# Patient Record
Sex: FEMALE | Race: WHITE | NOT HISPANIC OR LATINO | Employment: FULL TIME | URBAN - NONMETROPOLITAN AREA
[De-identification: names, ages, dates, MRNs, and addresses within clinical notes are randomized per-mention and may not be internally consistent; named-entity substitution may affect disease eponyms.]

---

## 2022-01-06 ENCOUNTER — APPOINTMENT (EMERGENCY)
Dept: RADIOLOGY | Facility: HOSPITAL | Age: 62
End: 2022-01-06
Payer: COMMERCIAL

## 2022-01-06 ENCOUNTER — HOSPITAL ENCOUNTER (EMERGENCY)
Facility: HOSPITAL | Age: 62
Discharge: HOME | End: 2022-01-06
Attending: EMERGENCY MEDICINE | Admitting: EMERGENCY MEDICINE
Payer: COMMERCIAL

## 2022-01-06 ENCOUNTER — APPOINTMENT (EMERGENCY)
Dept: CT IMAGING | Facility: HOSPITAL | Age: 62
End: 2022-01-06
Payer: COMMERCIAL

## 2022-01-06 VITALS
DIASTOLIC BLOOD PRESSURE: 71 MMHG | HEIGHT: 62 IN | TEMPERATURE: 98.3 F | HEART RATE: 81 BPM | RESPIRATION RATE: 16 BRPM | OXYGEN SATURATION: 95 % | WEIGHT: 136 LBS | SYSTOLIC BLOOD PRESSURE: 112 MMHG | BODY MASS INDEX: 25.03 KG/M2

## 2022-01-06 DIAGNOSIS — R05.9 COUGH: Primary | ICD-10-CM

## 2022-01-06 LAB
ALBUMIN SERPL BCP-MCNC: 3.2 G/DL (ref 3.5–5)
ALP SERPL-CCNC: 83 U/L (ref 46–116)
ALT SERPL W P-5'-P-CCNC: 25 U/L (ref 12–78)
ANION GAP SERPL CALCULATED.3IONS-SCNC: 8 MMOL/L (ref 4–13)
AST SERPL W P-5'-P-CCNC: 36 U/L (ref 5–45)
ATRIAL RATE: 70 BPM
BASOPHILS # BLD AUTO: 0.06 THOUSANDS/ΜL (ref 0–0.1)
BASOPHILS NFR BLD AUTO: 1 % (ref 0–1)
BILIRUB SERPL-MCNC: 0.55 MG/DL (ref 0.2–1)
BUN SERPL-MCNC: 11 MG/DL (ref 5–25)
CALCIUM ALBUM COR SERPL-MCNC: 9 MG/DL (ref 8.3–10.1)
CALCIUM SERPL-MCNC: 8.4 MG/DL (ref 8.3–10.1)
CARDIAC TROPONIN I PNL SERPL HS: 3 NG/L
CHLORIDE SERPL-SCNC: 107 MMOL/L (ref 100–108)
CO2 SERPL-SCNC: 25 MMOL/L (ref 21–32)
CREAT SERPL-MCNC: 0.76 MG/DL (ref 0.6–1.3)
D DIMER PPP FEU-MCNC: 0.6 UG/ML FEU
EOSINOPHIL # BLD AUTO: 0.5 THOUSAND/ΜL (ref 0–0.61)
EOSINOPHIL NFR BLD AUTO: 9 % (ref 0–6)
ERYTHROCYTE [DISTWIDTH] IN BLOOD BY AUTOMATED COUNT: 12.9 % (ref 11.6–15.1)
GFR SERPL CREATININE-BSD FRML MDRD: 84 ML/MIN/1.73SQ M
GLUCOSE SERPL-MCNC: 91 MG/DL (ref 65–140)
HCT VFR BLD AUTO: 40.2 % (ref 34.8–46.1)
HGB BLD-MCNC: 13.4 G/DL (ref 11.5–15.4)
IMM GRANULOCYTES # BLD AUTO: 0.02 THOUSAND/UL (ref 0–0.2)
IMM GRANULOCYTES NFR BLD AUTO: 0 % (ref 0–2)
LYMPHOCYTES # BLD AUTO: 0.85 THOUSANDS/ΜL (ref 0.6–4.47)
LYMPHOCYTES NFR BLD AUTO: 15 % (ref 14–44)
MCH RBC QN AUTO: 31.2 PG (ref 26.8–34.3)
MCHC RBC AUTO-ENTMCNC: 33.3 G/DL (ref 31.4–37.4)
MCV RBC AUTO: 94 FL (ref 82–98)
MONOCYTES # BLD AUTO: 0.39 THOUSAND/ΜL (ref 0.17–1.22)
MONOCYTES NFR BLD AUTO: 7 % (ref 4–12)
NEUTROPHILS # BLD AUTO: 3.81 THOUSANDS/ΜL (ref 1.85–7.62)
NEUTS SEG NFR BLD AUTO: 68 % (ref 43–75)
NRBC BLD AUTO-RTO: 0 /100 WBCS
P AXIS: 54 DEGREES
PLATELET # BLD AUTO: 263 THOUSANDS/UL (ref 149–390)
PMV BLD AUTO: 9 FL (ref 8.9–12.7)
POTASSIUM SERPL-SCNC: 4.2 MMOL/L (ref 3.5–5.3)
PR INTERVAL: 176 MS
PROT SERPL-MCNC: 6.9 G/DL (ref 6.4–8.2)
QRS AXIS: 88 DEGREES
QRSD INTERVAL: 70 MS
QT INTERVAL: 412 MS
QTC INTERVAL: 444 MS
RBC # BLD AUTO: 4.29 MILLION/UL (ref 3.81–5.12)
SODIUM SERPL-SCNC: 140 MMOL/L (ref 136–145)
T WAVE AXIS: 47 DEGREES
VENTRICULAR RATE: 70 BPM
WBC # BLD AUTO: 5.63 THOUSAND/UL (ref 4.31–10.16)

## 2022-01-06 PROCEDURE — 85379 FIBRIN DEGRADATION QUANT: CPT | Performed by: EMERGENCY MEDICINE

## 2022-01-06 PROCEDURE — 93005 ELECTROCARDIOGRAM TRACING: CPT

## 2022-01-06 PROCEDURE — 71275 CT ANGIOGRAPHY CHEST: CPT

## 2022-01-06 PROCEDURE — G1004 CDSM NDSC: HCPCS

## 2022-01-06 PROCEDURE — 71045 X-RAY EXAM CHEST 1 VIEW: CPT

## 2022-01-06 PROCEDURE — 36415 COLL VENOUS BLD VENIPUNCTURE: CPT

## 2022-01-06 PROCEDURE — 99285 EMERGENCY DEPT VISIT HI MDM: CPT

## 2022-01-06 PROCEDURE — 85025 COMPLETE CBC W/AUTO DIFF WBC: CPT | Performed by: EMERGENCY MEDICINE

## 2022-01-06 PROCEDURE — 99285 EMERGENCY DEPT VISIT HI MDM: CPT | Performed by: EMERGENCY MEDICINE

## 2022-01-06 PROCEDURE — 93010 ELECTROCARDIOGRAM REPORT: CPT | Performed by: INTERNAL MEDICINE

## 2022-01-06 PROCEDURE — 80053 COMPREHEN METABOLIC PANEL: CPT | Performed by: EMERGENCY MEDICINE

## 2022-01-06 PROCEDURE — 84484 ASSAY OF TROPONIN QUANT: CPT | Performed by: EMERGENCY MEDICINE

## 2022-01-06 RX ADMIN — IOHEXOL 85 ML: 350 INJECTION, SOLUTION INTRAVENOUS at 09:09

## 2022-01-06 NOTE — DISCHARGE INSTRUCTIONS
The CT scan performed today did not show any evidence of blood clots in your lungs  It did show some scattered opacities in the lungs which may be due to residual findings from previous COVID infection or other viral pneumonia  However you have no fever or abnormality and the blood work to indicate bacterial pneumonia  There is no indication at this time for treatment with antibiotics  The CT scan also revealed inflamed lymph nodes in the lungs for which you should follow-up in the very near future with your cancer specialist   I also recommend that you follow-up with the gastroenterologist listed below or the gastroenterologist of your choice as the pain that you are experiencing may be related to acid reflux disease

## 2022-01-06 NOTE — ED PROVIDER NOTES
History  Chief Complaint   Patient presents with    Shortness of Breath     SOB x 2 weeks, just finished Doxy for possible Pna  Had to sit down after drying her hair  Intermittent headaches  Back pain on deep breathing  PCP would like PE r/o, Pt sp[eaking in complete sentences in Triage and in no acute distress  Patient with prior history of lung cancer and left lower lobe resection, now in remission, had COVID infection in November, now complains of 2 weeks of shortness of breath, epigastric burning, coughing  No fevers or nausea or vomiting  No diarrhea or other abdominal pain  Patient had completed 3 days of prednisone and a course of doxycycline  Patient sent to ED by PCP for evaluation of possible PE  Patient does have a history of acid reflux however does not take any acid reflux medications  History provided by:  Patient   used: No    Shortness of Breath  Severity:  Moderate  Onset quality:  Gradual  Duration:  2 weeks  Timing:  Constant  Progression:  Unchanged  Chronicity:  New  Relieved by:  Nothing  Worsened by:  Nothing  Ineffective treatments:  None tried  Associated symptoms: cough    Associated symptoms: no abdominal pain, no chest pain, no ear pain, no fever, no headaches, no hemoptysis, no neck pain, no rash, no sore throat, no sputum production, no syncope, no vomiting and no wheezing        None       No past medical history on file  No past surgical history on file  No family history on file  I have reviewed and agree with the history as documented  E-Cigarette/Vaping     E-Cigarette/Vaping Substances     Social History     Tobacco Use    Smoking status: Former Smoker     Types: Cigarettes     Quit date: 2008     Years since quittin 0    Smokeless tobacco: Never Used   Substance Use Topics    Alcohol use: Not Currently    Drug use: Never       Review of Systems   Constitutional: Negative for chills and fever     HENT: Negative for ear pain, hearing loss, sore throat, trouble swallowing and voice change  Eyes: Negative for pain and discharge  Respiratory: Positive for cough and shortness of breath  Negative for hemoptysis, sputum production and wheezing  Cardiovascular: Negative for chest pain, palpitations and syncope  Gastrointestinal: Negative for abdominal pain, blood in stool, constipation, diarrhea, nausea and vomiting  Genitourinary: Negative for dysuria, flank pain, frequency and hematuria  Musculoskeletal: Negative for joint swelling, neck pain and neck stiffness  Skin: Negative for rash and wound  Neurological: Negative for dizziness, seizures, syncope, facial asymmetry and headaches  Psychiatric/Behavioral: Negative for hallucinations, self-injury and suicidal ideas  All other systems reviewed and are negative  Physical Exam  Physical Exam  Vitals and nursing note reviewed  Constitutional:       General: She is not in acute distress  Appearance: She is well-developed  HENT:      Head: Normocephalic and atraumatic  Right Ear: External ear normal       Left Ear: External ear normal    Eyes:      General: No scleral icterus  Right eye: No discharge  Left eye: No discharge  Extraocular Movements: Extraocular movements intact  Conjunctiva/sclera: Conjunctivae normal    Cardiovascular:      Rate and Rhythm: Normal rate and regular rhythm  Heart sounds: Normal heart sounds  No murmur heard  Pulmonary:      Effort: Pulmonary effort is normal  No tachypnea, accessory muscle usage or respiratory distress  Breath sounds: Normal breath sounds  No stridor  No decreased breath sounds, wheezing, rhonchi or rales  Abdominal:      General: Bowel sounds are normal  There is no distension  Palpations: Abdomen is soft  Tenderness: There is no abdominal tenderness  There is no guarding or rebound  Musculoskeletal:         General: No deformity  Normal range of motion  Cervical back: Normal range of motion and neck supple  Skin:     General: Skin is warm and dry  Findings: No rash  Neurological:      General: No focal deficit present  Mental Status: She is alert and oriented to person, place, and time  Cranial Nerves: No cranial nerve deficit  Psychiatric:         Mood and Affect: Mood normal          Behavior: Behavior normal          Thought Content:  Thought content normal          Judgment: Judgment normal          Vital Signs  ED Triage Vitals   Temperature Pulse Respirations Blood Pressure SpO2   01/06/22 0633 01/06/22 0616 01/06/22 0616 01/06/22 0616 01/06/22 0616   98 3 °F (36 8 °C) 81 16 112/71 95 %      Temp Source Heart Rate Source Patient Position - Orthostatic VS BP Location FiO2 (%)   01/06/22 0633 01/06/22 0616 01/06/22 0616 01/06/22 0616 --   Temporal Monitor Sitting Right arm       Pain Score       --                  Vitals:    01/06/22 0616   BP: 112/71   Pulse: 81   Patient Position - Orthostatic VS: Sitting         Visual Acuity      ED Medications  Medications   iohexol (OMNIPAQUE) 350 MG/ML injection (SINGLE-DOSE) 85 mL (85 mL Intravenous Given 1/6/22 0909)       Diagnostic Studies  Results Reviewed     Procedure Component Value Units Date/Time    D-dimer, quantitative [[id number]]  (Abnormal) Collected: 01/06/22 0649    Lab Status: Final result Specimen: Blood from Arm, Left Updated: 01/06/22 0820     D-Dimer, Quant 0 60 ug/ml FEU     HS Troponin I 4hr [[id number]]     Lab Status: No result Specimen: Blood     HS Troponin 0hr (reflex protocol) [[id number]]  (Normal) Collected: 01/06/22 0649    Lab Status: Final result Specimen: Blood from Arm, Left Updated: 01/06/22 0725     hs TnI 0hr 3 ng/L     HS Troponin I 2hr [[id number]]     Lab Status: No result Specimen: Blood     Comprehensive metabolic panel [[id number]]  (Abnormal) Collected: 01/06/22 0649    Lab Status: Final result Specimen: Blood from Arm, Left Updated: 01/06/22 5877 Sodium 140 mmol/L      Potassium 4 2 mmol/L      Chloride 107 mmol/L      CO2 25 mmol/L      ANION GAP 8 mmol/L      BUN 11 mg/dL      Creatinine 0 76 mg/dL      Glucose 91 mg/dL      Calcium 8 4 mg/dL      Corrected Calcium 9 0 mg/dL      AST 36 U/L      ALT 25 U/L      Alkaline Phosphatase 83 U/L      Total Protein 6 9 g/dL      Albumin 3 2 g/dL      Total Bilirubin 0 55 mg/dL      eGFR 84 ml/min/1 73sq m     Narrative:      [employer] guidelines for Chronic Kidney Disease (CKD):     Stage 1 with normal or high GFR (GFR > 90 mL/min/1 73 square meters)    Stage 2 Mild CKD (GFR = 60-89 mL/min/1 73 square meters)    Stage 3A Moderate CKD (GFR = 45-59 mL/min/1 73 square meters)    Stage 3B Moderate CKD (GFR = 30-44 mL/min/1 73 square meters)    Stage 4 Severe CKD (GFR = 15-29 mL/min/1 73 square meters)    Stage 5 End Stage CKD (GFR <15 mL/min/1 73 square meters)  Note: GFR calculation is accurate only with a steady state creatinine    CBC and differential [[id number]]  (Abnormal) Collected: 01/06/22 0649    Lab Status: Final result Specimen: Blood from Arm, Left Updated: 01/06/22 0654     WBC 5 63 Thousand/uL      RBC 4 29 Million/uL      Hemoglobin 13 4 g/dL      Hematocrit 40 2 %      MCV 94 fL      MCH 31 2 pg      MCHC 33 3 g/dL      RDW 12 9 %      MPV 9 0 fL      Platelets 363 Thousands/uL      nRBC 0 /100 WBCs      Neutrophils Relative 68 %      Immat GRANS % 0 %      Lymphocytes Relative 15 %      Monocytes Relative 7 %      Eosinophils Relative 9 %      Basophils Relative 1 %      Neutrophils Absolute 3 81 Thousands/µL      Immature Grans Absolute 0 02 Thousand/uL      Lymphocytes Absolute 0 85 Thousands/µL      Monocytes Absolute 0 39 Thousand/µL      Eosinophils Absolute 0 50 Thousand/µL      Basophils Absolute 0 06 Thousands/µL                  CTA ed chest pe study   Final Result by Jurgen Concepcion MD (01/06 0935)      No evidence pulmonary embolus        Scattered groundglass opacities likely infectious in etiology  Conglomerate mediastinal and bilateral hilar lymphadenopathy, neoplastic involvement not excluded given history of lung carcinoma  Correlation with outside studies recommended  Workstation performed: [id number]         XR chest 1 view   ED Interpretation by Charley Paredes MD (01/06 5930)   No acute finding      Final Result by Shamar Gallego MD (01/06 0932)      Faint bilateral groundglass opacities  Findings are nonspecific but may represent multifocal pneumonia (to include sequela of Covid 19 associated pneumonia) as well as pulmonary interstitial edema  Clinical and laboratory correlation recommended  Workstation performed: [id number]                    Procedures  ECG 12 Lead Documentation Only    Date/Time: 1/6/2022 9:00 AM  Performed by: Charley Paredes MD  Authorized by: Charley Paredes MD     ECG reviewed by me, the ED Provider: yes    Patient location:  ED  Previous ECG:     Previous ECG:  Unavailable  Interpretation:     Interpretation: normal    Rate:     ECG rate:  70    ECG rate assessment: normal    Rhythm:     Rhythm: sinus rhythm    Ectopy:     Ectopy: none    QRS:     QRS axis:  Normal    QRS intervals:  Normal  Conduction:     Conduction: normal    ST segments:     ST segments:  Normal  T waves:     T waves: normal               ED Course  ED Course as of 01/06/22 0947   Thu Jan 06, 2022   0943 No pulmonary embolus on CTA  Discussed findings of hilar lymphadenopathy and need for follow-up with her cancer specialists with patient as well as finding of scattered opacities, likely infectious  These are likely residual from her previous COVID infection  Patient has no fevers or elevated white count  No indication at this time for antibiotic treatment    Patient has been provided with a CD of her CT scan to take with her for her next appointment with her oncologist MDM  Number of Diagnoses or Management Options     Amount and/or Complexity of Data Reviewed  Clinical lab tests: ordered and reviewed  Tests in the radiology section of CPT®: ordered and reviewed  Decide to obtain previous medical records or to obtain history from someone other than the patient: yes  Review and summarize past medical records: yes  Independent visualization of images, tracings, or specimens: yes        Disposition  Final diagnoses:   Cough     Time reflects when diagnosis was documented in both MDM as applicable and the Disposition within this note     Time User Action Codes Description Comment    1/6/2022  9:44 AM Carrie Sor Add [R05 9] Cough       ED Disposition     ED Disposition Condition Date/Time Comment    Discharge Stable u Jan 6, 2022  9:44 AM [facility] discharge to home/self care  Follow-up Information     Follow up With Specialties Details Why Contact Info    Your primary care physician   As needed     Erick Stein MD Gastroenterology   [address]  [address] [facility]  [phone]            Patient's Medications    No medications on file       No discharge procedures on file      PDMP Review     None          ED Provider  Electronically Signed by           Nancy Antunez MD  01/06/22 4694

## 2022-01-13 ENCOUNTER — HOSPITAL ENCOUNTER (INPATIENT)
Facility: HOSPITAL | Age: 62
LOS: 1 days | Discharge: HOME | DRG: 189 | End: 2022-01-14
Attending: EMERGENCY MEDICINE | Admitting: FAMILY MEDICINE
Payer: COMMERCIAL

## 2022-01-13 ENCOUNTER — APPOINTMENT (EMERGENCY)
Dept: CT IMAGING | Facility: HOSPITAL | Age: 62
DRG: 189 | End: 2022-01-13
Payer: COMMERCIAL

## 2022-01-13 DIAGNOSIS — J84.9 BILATERAL INTERSTITIAL PNEUMONIA (HCC): ICD-10-CM

## 2022-01-13 DIAGNOSIS — J18.9 BILATERAL PNEUMONIA: ICD-10-CM

## 2022-01-13 DIAGNOSIS — R06.00 DYSPNEA: ICD-10-CM

## 2022-01-13 DIAGNOSIS — R09.02 HYPOXIA: Primary | ICD-10-CM

## 2022-01-13 DIAGNOSIS — J96.01 ACUTE RESPIRATORY FAILURE WITH HYPOXIA (HCC): ICD-10-CM

## 2022-01-13 DIAGNOSIS — J45.21 MILD INTERMITTENT ASTHMA WITH ACUTE EXACERBATION: ICD-10-CM

## 2022-01-13 LAB
ANION GAP SERPL CALCULATED.3IONS-SCNC: 9 MMOL/L (ref 4–13)
BASOPHILS # BLD AUTO: 0.05 THOUSANDS/ΜL (ref 0–0.1)
BASOPHILS NFR BLD AUTO: 1 % (ref 0–1)
BUN SERPL-MCNC: 14 MG/DL (ref 5–25)
CALCIUM SERPL-MCNC: 8.5 MG/DL (ref 8.3–10.1)
CHLORIDE SERPL-SCNC: 103 MMOL/L (ref 100–108)
CO2 SERPL-SCNC: 25 MMOL/L (ref 21–32)
CREAT SERPL-MCNC: 0.89 MG/DL (ref 0.6–1.3)
EOSINOPHIL # BLD AUTO: 0.56 THOUSAND/ΜL (ref 0–0.61)
EOSINOPHIL NFR BLD AUTO: 6 % (ref 0–6)
ERYTHROCYTE [DISTWIDTH] IN BLOOD BY AUTOMATED COUNT: 12.8 % (ref 11.6–15.1)
FLUAV RNA RESP QL NAA+PROBE: NEGATIVE
FLUBV RNA RESP QL NAA+PROBE: NEGATIVE
GFR SERPL CREATININE-BSD FRML MDRD: 70 ML/MIN/1.73SQ M
GLUCOSE SERPL-MCNC: 100 MG/DL (ref 65–140)
HCT VFR BLD AUTO: 37.5 % (ref 34.8–46.1)
HGB BLD-MCNC: 12.3 G/DL (ref 11.5–15.4)
IMM GRANULOCYTES # BLD AUTO: 0.03 THOUSAND/UL (ref 0–0.2)
IMM GRANULOCYTES NFR BLD AUTO: 0 % (ref 0–2)
LYMPHOCYTES # BLD AUTO: 0.94 THOUSANDS/ΜL (ref 0.6–4.47)
LYMPHOCYTES NFR BLD AUTO: 10 % (ref 14–44)
MCH RBC QN AUTO: 30.6 PG (ref 26.8–34.3)
MCHC RBC AUTO-ENTMCNC: 32.8 G/DL (ref 31.4–37.4)
MCV RBC AUTO: 93 FL (ref 82–98)
MONOCYTES # BLD AUTO: 0.45 THOUSAND/ΜL (ref 0.17–1.22)
MONOCYTES NFR BLD AUTO: 5 % (ref 4–12)
NEUTROPHILS # BLD AUTO: 7.2 THOUSANDS/ΜL (ref 1.85–7.62)
NEUTS SEG NFR BLD AUTO: 78 % (ref 43–75)
NRBC BLD AUTO-RTO: 0 /100 WBCS
PLATELET # BLD AUTO: 344 THOUSANDS/UL (ref 149–390)
PMV BLD AUTO: 9.1 FL (ref 8.9–12.7)
POTASSIUM SERPL-SCNC: 4.1 MMOL/L (ref 3.5–5.3)
RBC # BLD AUTO: 4.02 MILLION/UL (ref 3.81–5.12)
RSV RNA RESP QL NAA+PROBE: NEGATIVE
SARS-COV-2 RNA RESP QL NAA+PROBE: NEGATIVE
SODIUM SERPL-SCNC: 137 MMOL/L (ref 136–145)
WBC # BLD AUTO: 9.23 THOUSAND/UL (ref 4.31–10.16)

## 2022-01-13 PROCEDURE — 36415 COLL VENOUS BLD VENIPUNCTURE: CPT | Performed by: EMERGENCY MEDICINE

## 2022-01-13 PROCEDURE — 87040 BLOOD CULTURE FOR BACTERIA: CPT | Performed by: EMERGENCY MEDICINE

## 2022-01-13 PROCEDURE — 84145 PROCALCITONIN (PCT): CPT | Performed by: NURSE PRACTITIONER

## 2022-01-13 PROCEDURE — 0241U HB NFCT DS VIR RESP RNA 4 TRGT: CPT | Performed by: EMERGENCY MEDICINE

## 2022-01-13 PROCEDURE — 80048 BASIC METABOLIC PNL TOTAL CA: CPT | Performed by: EMERGENCY MEDICINE

## 2022-01-13 PROCEDURE — 83735 ASSAY OF MAGNESIUM: CPT | Performed by: NURSE PRACTITIONER

## 2022-01-13 PROCEDURE — 85025 COMPLETE CBC W/AUTO DIFF WBC: CPT | Performed by: EMERGENCY MEDICINE

## 2022-01-13 PROCEDURE — 99285 EMERGENCY DEPT VISIT HI MDM: CPT

## 2022-01-13 PROCEDURE — 71275 CT ANGIOGRAPHY CHEST: CPT

## 2022-01-13 PROCEDURE — 96374 THER/PROPH/DIAG INJ IV PUSH: CPT

## 2022-01-13 PROCEDURE — 99285 EMERGENCY DEPT VISIT HI MDM: CPT | Performed by: EMERGENCY MEDICINE

## 2022-01-13 PROCEDURE — 83605 ASSAY OF LACTIC ACID: CPT | Performed by: EMERGENCY MEDICINE

## 2022-01-13 RX ORDER — CEFTRIAXONE 1 G/50ML
1000 INJECTION, SOLUTION INTRAVENOUS ONCE
Status: COMPLETED | OUTPATIENT
Start: 2022-01-13 | End: 2022-01-14

## 2022-01-13 RX ORDER — BENZONATATE 100 MG/1
200 CAPSULE ORAL ONCE
Status: COMPLETED | OUTPATIENT
Start: 2022-01-13 | End: 2022-01-13

## 2022-01-13 RX ADMIN — MORPHINE SULFATE 2 MG: 2 INJECTION, SOLUTION INTRAMUSCULAR; INTRAVENOUS at 22:02

## 2022-01-13 RX ADMIN — IOHEXOL 85 ML: 350 INJECTION, SOLUTION INTRAVENOUS at 22:35

## 2022-01-13 RX ADMIN — BENZONATATE 200 MG: 100 CAPSULE ORAL at 22:03

## 2022-01-13 NOTE — Clinical Note
Curtis Harada was seen and treated in our emergency department on 1/13/2022  Diagnosis:     Afshin Jiménez  may return to work on return date  She may return on this date: 01/25/2022         If you have any questions or concerns, please don't hesitate to call        Chiqui Walters PA-C    ______________________________           _______________          _______________  Hospital Representative                              Date                                Time

## 2022-01-13 NOTE — Clinical Note
Marylu Roula was seen and treated in our emergency department on 1/13/2022  Diagnosis:     Nelly Humphries  may return to work on return date  She may return on this date: 01/25/2022         If you have any questions or concerns, please don't hesitate to call        Carlos Vieira PA-C    ______________________________           _______________          _______________  Hospital Representative                              Date                                Time

## 2022-01-14 VITALS
BODY MASS INDEX: 24.84 KG/M2 | SYSTOLIC BLOOD PRESSURE: 105 MMHG | HEART RATE: 70 BPM | HEIGHT: 62 IN | OXYGEN SATURATION: 97 % | DIASTOLIC BLOOD PRESSURE: 53 MMHG | RESPIRATION RATE: 16 BRPM | TEMPERATURE: 98.1 F | WEIGHT: 135 LBS

## 2022-01-14 PROBLEM — J96.01 ACUTE RESPIRATORY FAILURE WITH HYPOXIA (HCC): Status: ACTIVE | Noted: 2022-01-14

## 2022-01-14 PROBLEM — K76.89 HEPATIC CYST: Status: ACTIVE | Noted: 2022-01-14

## 2022-01-14 PROBLEM — Z85.118 HISTORY OF LUNG CANCER: Status: ACTIVE | Noted: 2022-01-14

## 2022-01-14 PROBLEM — J45.21 MILD INTERMITTENT ASTHMA WITH ACUTE EXACERBATION: Status: ACTIVE | Noted: 2022-01-14

## 2022-01-14 LAB
BASE EX.OXY STD BLDV CALC-SCNC: 64.4 % (ref 60–80)
BASE EXCESS BLDV CALC-SCNC: -0.2 MMOL/L
CARDIAC TROPONIN I PNL SERPL HS: 5 NG/L (ref 8–18)
CRP SERPL QL: 29.6 MG/L
DME PARACHUTE DELIVERY DATE ACTUAL: NORMAL
DME PARACHUTE DELIVERY DATE EXPECTED: NORMAL
DME PARACHUTE DELIVERY DATE REQUESTED: NORMAL
DME PARACHUTE DELIVERY NOTE: NORMAL
DME PARACHUTE ITEM DESCRIPTION: NORMAL
DME PARACHUTE ORDER STATUS: NORMAL
DME PARACHUTE SUPPLIER NAME: NORMAL
DME PARACHUTE SUPPLIER PHONE: NORMAL
FLUAV RNA RESP QL NAA+PROBE: NEGATIVE
FLUBV RNA RESP QL NAA+PROBE: NEGATIVE
HCO3 BLDV-SCNC: 25.2 MMOL/L (ref 24–30)
LACTATE SERPL-SCNC: 0.7 MMOL/L (ref 0.5–2)
MAGNESIUM SERPL-MCNC: 2.1 MG/DL (ref 1.6–2.6)
NT-PROBNP SERPL-MCNC: 205 PG/ML
O2 CT BLDV-SCNC: 11.6 ML/DL
PCO2 BLDV: 43.9 MM HG (ref 42–50)
PH BLDV: 7.38 [PH] (ref 7.3–7.4)
PO2 BLDV: 35.6 MM HG (ref 35–45)
PROCALCITONIN SERPL-MCNC: 0.08 NG/ML
PROCALCITONIN SERPL-MCNC: 0.08 NG/ML
RSV RNA RESP QL NAA+PROBE: NEGATIVE
SARS-COV-2 RNA RESP QL NAA+PROBE: NEGATIVE

## 2022-01-14 PROCEDURE — 87581 M.PNEUMON DNA AMP PROBE: CPT | Performed by: INTERNAL MEDICINE

## 2022-01-14 PROCEDURE — 86140 C-REACTIVE PROTEIN: CPT | Performed by: NURSE PRACTITIONER

## 2022-01-14 PROCEDURE — NC001 PR NO CHARGE: Performed by: FAMILY MEDICINE

## 2022-01-14 PROCEDURE — 94761 N-INVAS EAR/PLS OXIMETRY MLT: CPT

## 2022-01-14 PROCEDURE — 87633 RESP VIRUS 12-25 TARGETS: CPT | Performed by: INTERNAL MEDICINE

## 2022-01-14 PROCEDURE — 82805 BLOOD GASES W/O2 SATURATION: CPT | Performed by: NURSE PRACTITIONER

## 2022-01-14 PROCEDURE — 99205 OFFICE O/P NEW HI 60 MIN: CPT | Performed by: INTERNAL MEDICINE

## 2022-01-14 PROCEDURE — 36415 COLL VENOUS BLD VENIPUNCTURE: CPT | Performed by: NURSE PRACTITIONER

## 2022-01-14 PROCEDURE — 84484 ASSAY OF TROPONIN QUANT: CPT | Performed by: NURSE PRACTITIONER

## 2022-01-14 PROCEDURE — 0241U HB NFCT DS VIR RESP RNA 4 TRGT: CPT | Performed by: INTERNAL MEDICINE

## 2022-01-14 PROCEDURE — 87798 DETECT AGENT NOS DNA AMP: CPT | Performed by: INTERNAL MEDICINE

## 2022-01-14 PROCEDURE — 84145 PROCALCITONIN (PCT): CPT | Performed by: NURSE PRACTITIONER

## 2022-01-14 PROCEDURE — 83880 ASSAY OF NATRIURETIC PEPTIDE: CPT | Performed by: NURSE PRACTITIONER

## 2022-01-14 PROCEDURE — 99222 1ST HOSP IP/OBS MODERATE 55: CPT | Performed by: FAMILY MEDICINE

## 2022-01-14 PROCEDURE — 87486 CHLMYD PNEUM DNA AMP PROBE: CPT | Performed by: INTERNAL MEDICINE

## 2022-01-14 RX ORDER — PANTOPRAZOLE SODIUM 40 MG/1
40 TABLET, DELAYED RELEASE ORAL
Qty: 30 TABLET | Refills: 0 | Status: SHIPPED | OUTPATIENT
Start: 2022-01-15 | End: 2022-03-02

## 2022-01-14 RX ORDER — PREDNISONE 20 MG/1
TABLET ORAL
Qty: 18 TABLET | Refills: 0 | Status: SHIPPED | OUTPATIENT
Start: 2022-01-14 | End: 2022-01-26

## 2022-01-14 RX ORDER — BENZONATATE 100 MG/1
100 CAPSULE ORAL 3 TIMES DAILY PRN
Qty: 20 CAPSULE | Refills: 0 | Status: SHIPPED | OUTPATIENT
Start: 2022-01-14

## 2022-01-14 RX ORDER — IPRATROPIUM BROMIDE AND ALBUTEROL SULFATE 2.5; .5 MG/3ML; MG/3ML
3 SOLUTION RESPIRATORY (INHALATION)
Status: DISCONTINUED | OUTPATIENT
Start: 2022-01-14 | End: 2022-01-14

## 2022-01-14 RX ORDER — ALBUTEROL SULFATE 90 UG/1
2 AEROSOL, METERED RESPIRATORY (INHALATION) EVERY 4 HOURS PRN
Qty: 18 G | Refills: 0 | Status: SHIPPED | OUTPATIENT
Start: 2022-01-14

## 2022-01-14 RX ORDER — IPRATROPIUM BROMIDE AND ALBUTEROL SULFATE 2.5; .5 MG/3ML; MG/3ML
3 SOLUTION RESPIRATORY (INHALATION) 4 TIMES DAILY
Qty: 360 ML | Refills: 0 | Status: SHIPPED | OUTPATIENT
Start: 2022-01-14 | End: 2022-02-08 | Stop reason: SDUPTHER

## 2022-01-14 RX ORDER — ALBUTEROL SULFATE 90 UG/1
2 AEROSOL, METERED RESPIRATORY (INHALATION) EVERY 4 HOURS PRN
Status: DISCONTINUED | OUTPATIENT
Start: 2022-01-14 | End: 2022-01-14 | Stop reason: HOSPADM

## 2022-01-14 RX ORDER — BENZONATATE 100 MG/1
100 CAPSULE ORAL 3 TIMES DAILY PRN
Status: DISCONTINUED | OUTPATIENT
Start: 2022-01-14 | End: 2022-01-14 | Stop reason: HOSPADM

## 2022-01-14 RX ORDER — PANTOPRAZOLE SODIUM 40 MG/1
40 TABLET, DELAYED RELEASE ORAL
Status: DISCONTINUED | OUTPATIENT
Start: 2022-01-14 | End: 2022-01-14 | Stop reason: HOSPADM

## 2022-01-14 RX ORDER — ACETAMINOPHEN 325 MG/1
650 TABLET ORAL EVERY 6 HOURS PRN
Status: DISCONTINUED | OUTPATIENT
Start: 2022-01-14 | End: 2022-01-14 | Stop reason: HOSPADM

## 2022-01-14 RX ORDER — IPRATROPIUM BROMIDE AND ALBUTEROL SULFATE 2.5; .5 MG/3ML; MG/3ML
3 SOLUTION RESPIRATORY (INHALATION)
Status: DISCONTINUED | OUTPATIENT
Start: 2022-01-14 | End: 2022-01-14 | Stop reason: HOSPADM

## 2022-01-14 RX ORDER — ACETAMINOPHEN 325 MG/1
650 TABLET ORAL EVERY 6 HOURS PRN
Refills: 0
Start: 2022-01-14 | End: 2022-03-02

## 2022-01-14 RX ORDER — METHYLPREDNISOLONE SODIUM SUCCINATE 40 MG/ML
40 INJECTION, POWDER, LYOPHILIZED, FOR SOLUTION INTRAMUSCULAR; INTRAVENOUS EVERY 8 HOURS SCHEDULED
Status: DISCONTINUED | OUTPATIENT
Start: 2022-01-14 | End: 2022-01-14 | Stop reason: HOSPADM

## 2022-01-14 RX ADMIN — CEFTRIAXONE 1000 MG: 1 INJECTION, SOLUTION INTRAVENOUS at 01:18

## 2022-01-14 RX ADMIN — METHYLPREDNISOLONE SODIUM SUCCINATE 40 MG: 40 INJECTION, POWDER, FOR SOLUTION INTRAMUSCULAR; INTRAVENOUS at 14:46

## 2022-01-14 RX ADMIN — IPRATROPIUM BROMIDE AND ALBUTEROL SULFATE 3 ML: 2.5; .5 SOLUTION RESPIRATORY (INHALATION) at 11:55

## 2022-01-14 RX ADMIN — ENOXAPARIN SODIUM 40 MG: 40 INJECTION SUBCUTANEOUS at 09:17

## 2022-01-14 RX ADMIN — PANTOPRAZOLE SODIUM 40 MG: 40 TABLET, DELAYED RELEASE ORAL at 09:16

## 2022-01-14 RX ADMIN — METHYLPREDNISOLONE SODIUM SUCCINATE 40 MG: 40 INJECTION, POWDER, FOR SOLUTION INTRAMUSCULAR; INTRAVENOUS at 03:12

## 2022-01-14 NOTE — ED PROVIDER NOTES
History  Chief Complaint   Patient presents with    Shortness of Breath     Pt reports ongoing pain at bottom of rib cage and worsenign SOB, appt with oncology on Monday      64year-old female somewhat anxious presents emergency room complaining of right-sided chest pain and shortness of breath  Patient reports pain is primarily localized to her right lateral rib cage  She also has radiation of the pain across her mid back  She reports that she has persistently felt short of breath  She reports a persisting cough  She was seen in our emergency department on the 6th of this month and had similar complaining was worked up at that time  No acute findings but there were findings that required follow-up with Oncology as per her CT reported that time  Patient does have a history of pulmonary carcinoma and had a left-sided resection of her lung  Patient had been previously sent to the emergency room by her PCP for evaluation for pulmonary embolism  She presents today reporting that she feels worse      Unvaccinated for COVID-19 and suffered from a COVID-19 infection in November 2021      History provided by:  Patient  Shortness of Breath  Severity:  Moderate  Onset quality:  Gradual  Timing:  Constant  Progression:  Waxing and waning  Chronicity:  Chronic  Context: activity    Relieved by:  Nothing  Worsened by: Activity, coughing, deep breathing, exertion and movement  Ineffective treatments:  None tried  Associated symptoms: chest pain, cough and headaches    Associated symptoms: no abdominal pain, no diaphoresis, no fever, no hemoptysis and no vomiting    Chest pain:     Quality: aching    Cough:     Cough characteristics:  Non-productive      None       History reviewed  No pertinent past medical history  History reviewed  No pertinent surgical history  History reviewed  No pertinent family history  I have reviewed and agree with the history as documented      E-Cigarette/Vaping E-Cigarette/Vaping Substances     Social History     Tobacco Use    Smoking status: Former Smoker     Types: Cigarettes     Quit date: 2008     Years since quittin 0    Smokeless tobacco: Never Used   Substance Use Topics    Alcohol use: Not Currently    Drug use: Never       Review of Systems   Constitutional: Negative for diaphoresis and fever  HENT: Negative  Eyes: Negative  Respiratory: Positive for cough and shortness of breath  Negative for hemoptysis  Cardiovascular: Positive for chest pain  Gastrointestinal: Negative  Negative for abdominal pain, diarrhea, nausea and vomiting  Genitourinary: Negative  Musculoskeletal: Negative  Neurological: Positive for headaches  All other systems reviewed and are negative  Physical Exam  Physical Exam  Vitals and nursing note reviewed  Constitutional:       General: She is awake  She is not in acute distress  Appearance: Normal appearance  She is well-developed and normal weight  She is not ill-appearing or toxic-appearing  HENT:      Head: Normocephalic and atraumatic  Hair is normal       Jaw: No pain on movement  Right Ear: External ear normal       Left Ear: External ear normal       Nose: Nose normal    Eyes:      General: Lids are normal  No scleral icterus  Extraocular Movements: Extraocular movements intact  Pupils: Pupils are equal, round, and reactive to light  Cardiovascular:      Rate and Rhythm: Normal rate and regular rhythm  Heart sounds: Normal heart sounds  No murmur heard  Pulmonary:      Effort: Pulmonary effort is normal  No tachypnea or respiratory distress  Breath sounds: Normal breath sounds  No stridor  No decreased breath sounds, wheezing, rhonchi or rales  Abdominal:      General: Abdomen is flat  There is no distension  Palpations: Abdomen is soft  Tenderness: There is no abdominal tenderness  There is no guarding     Musculoskeletal:         General: No deformity  Normal range of motion  Cervical back: Normal range of motion and neck supple  Skin:     General: Skin is warm and dry  Coloration: Skin is not jaundiced or pale  Findings: No rash  Neurological:      Mental Status: She is alert and oriented to person, place, and time  Mental status is at baseline  Cranial Nerves: No cranial nerve deficit  Psychiatric:         Attention and Perception: Attention normal          Mood and Affect: Mood is anxious  Speech: Speech normal          Behavior: Behavior normal          Vital Signs  ED Triage Vitals [01/13/22 2119]   Temperature Pulse Respirations Blood Pressure SpO2   98 5 °F (36 9 °C) 97 20 142/74 91 %      Temp Source Heart Rate Source Patient Position - Orthostatic VS BP Location FiO2 (%)   Temporal Monitor Lying Right arm --      Pain Score       --           Vitals:    01/13/22 2119   BP: 142/74   Pulse: 97   Patient Position - Orthostatic VS: Lying         Visual Acuity      ED Medications  Medications   cefTRIAXone (ROCEPHIN) IVPB (premix in dextrose) 1,000 mg 50 mL (has no administration in time range)   morphine injection 2 mg (2 mg Intravenous Given 1/13/22 2202)   benzonatate (TESSALON PERLES) capsule 200 mg (200 mg Oral Given 1/13/22 2203)   iohexol (OMNIPAQUE) 350 MG/ML injection (SINGLE-DOSE) 85 mL (85 mL Intravenous Given 1/13/22 2235)       Diagnostic Studies  Results Reviewed     Procedure Component Value Units Date/Time    Blood culture #1 [[id number]] Collected: 01/13/22 2355    Lab Status: In process Specimen: Blood from Arm, Right Updated: 01/14/22 0002    Blood culture #2 [[id number]] Collected: 01/13/22 2355    Lab Status: In process Specimen: Blood from Arm, Left Updated: 01/14/22 0002    Lactic acid [[id number]] Collected: 01/13/22 2355    Lab Status:  In process Specimen: Blood from Arm, Left Updated: 01/14/22 0000    COVID/FLU/RSV - 2 hour TAT [[id number]]  (Normal) Collected: 01/13/22 2236    Lab Status: Final result Specimen: Nares from Nose Updated: 01/13/22 4726     SARS-CoV-2 Negative     INFLUENZA A PCR Negative     INFLUENZA B PCR Negative     RSV PCR Negative    Narrative:      FOR PEDIATRIC PATIENTS - copy/paste COVID Guidelines URL to browser: [email or web address]    SARS-CoV-2 assay is a Nucleic Acid Amplification assay intended for the  qualitative detection of nucleic acid from SARS-CoV-2 in nasopharyngeal  swabs  Results are for the presumptive identification of SARS-CoV-2 RNA  Positive results are indicative of infection with SARS-CoV-2, the virus  causing COVID-19, but do not rule out bacterial infection or co-infection  with other viruses  Laboratories within the [place] and its  territories are required to report all positive results to the appropriate  public health authorities  Negative results do not preclude SARS-CoV-2  infection and should not be used as the sole basis for treatment or other  patient management decisions  Negative results must be combined with  clinical observations, patient history, and epidemiological information  This test has not been FDA cleared or approved  This test has been authorized by FDA under an Emergency Use Authorization  (EUA)  This test is only authorized for the duration of time the  declaration that circumstances exist justifying the authorization of the  emergency use of an in vitro diagnostic tests for detection of SARS-CoV-2  virus and/or diagnosis of COVID-19 infection under section 564(b)(1) of  the Act, 21 U  S C  670LDC-7(Q)(7), unless the authorization is terminated  or revoked sooner  The test has been validated but independent review by FDA  and CLIA is pending  Test performed using [employer] GeneXpert: This RT-PCR assay targets N2,  a region unique to SARS-CoV-2  A conserved region in the E-gene was chosen  for pan-Sarbecovirus detection which includes SARS-CoV-2      Basic metabolic panel [[id number]] Collected: 01/13/22 2201    Lab Status: Final result Specimen: Blood from Arm, Right Updated: 01/13/22 2219     Sodium 137 mmol/L      Potassium 4 1 mmol/L      Chloride 103 mmol/L      CO2 25 mmol/L      ANION GAP 9 mmol/L      BUN 14 mg/dL      Creatinine 0 89 mg/dL      Glucose 100 mg/dL      Calcium 8 5 mg/dL      eGFR 70 ml/min/1 73sq m     Narrative:      [place] guidelines for Chronic Kidney Disease (CKD):     Stage 1 with normal or high GFR (GFR > 90 mL/min/1 73 square meters)    Stage 2 Mild CKD (GFR = 60-89 mL/min/1 73 square meters)    Stage 3A Moderate CKD (GFR = 45-59 mL/min/1 73 square meters)    Stage 3B Moderate CKD (GFR = 30-44 mL/min/1 73 square meters)    Stage 4 Severe CKD (GFR = 15-29 mL/min/1 73 square meters)    Stage 5 End Stage CKD (GFR <15 mL/min/1 73 square meters)  Note: GFR calculation is accurate only with a steady state creatinine    CBC and differential [[id number]]  (Abnormal) Collected: 01/13/22 2201    Lab Status: Final result Specimen: Blood from Arm, Right Updated: 01/13/22 2210     WBC 9 23 Thousand/uL      RBC 4 02 Million/uL      Hemoglobin 12 3 g/dL      Hematocrit 37 5 %      MCV 93 fL      MCH 30 6 pg      MCHC 32 8 g/dL      RDW 12 8 %      MPV 9 1 fL      Platelets 261 Thousands/uL      nRBC 0 /100 WBCs      Neutrophils Relative 78 %      Immat GRANS % 0 %      Lymphocytes Relative 10 %      Monocytes Relative 5 %      Eosinophils Relative 6 %      Basophils Relative 1 %      Neutrophils Absolute 7 20 Thousands/µL      Immature Grans Absolute 0 03 Thousand/uL      Lymphocytes Absolute 0 94 Thousands/µL      Monocytes Absolute 0 45 Thousand/µL      Eosinophils Absolute 0 56 Thousand/µL      Basophils Absolute 0 05 Thousands/µL                  CTA ED chest PE Study   Final Result by Emi Avalos MD (01/13 2332)      1  No evidence of pulmonary embolus     2   Increased patchy groundglass opacities bilaterally with interlobular septal thickening suspicious for multifocal infection including viral pneumonia  Differential includes pulmonary edema  3   Trace left pleural effusion  4   Stable bilateral hilar and mediastinal lymphadenopathy  Follow-up is recommended  Workstation performed: [id number]                    Procedures  Procedures         ED Course  ED Course as of 01/14/22 0007   Thu Jan 13, 2022   2345 CT showing worsening bilateral lower ground glass opacities  Patient with a history of lung resection, lung cancer, increasing pain and increasing findings on her lung field with hypoxia  Will refer for admission and start antibiotics  Fri Jan 14, 2022 0006 Updated both patient and sister at bedside  All questions answered  SBIRT 20yo+      Most Recent Value   SBIRT (24 yo +)    In order to provide better care to our patients, we are screening all of our patients for alcohol and drug use  Would it be okay to ask you these screening questions? Yes Filed at: 01/13/2022 2359   Initial Alcohol Screen: US AUDIT-C     1  How often do you have a drink containing alcohol? 0 Filed at: 01/13/2022 2359   2  How many drinks containing alcohol do you have on a typical day you are drinking? 0 Filed at: 01/13/2022 2359   3a  Male UNDER 65: How often do you have five or more drinks on one occasion? 0 Filed at: 01/13/2022 2359   3b  FEMALE Any Age, or MALE 65+: How often do you have 4 or more drinks on one occassion? 0 Filed at: 01/13/2022 2359   Audit-C Score 0 Filed at: 01/13/2022 2359   KETTY: How many times in the past year have you    Used an illegal drug or used a prescription medication for non-medical reasons?  Never Filed at: 01/13/2022 2359                    MDM  Number of Diagnoses or Management Options  Bilateral pneumonia: new and requires workup  Dyspnea: new and requires workup  Hypoxia: new and requires workup  Diagnosis management comments: Patient presented to the emergency department and a MSE was performed  The patient was evaluated and diagnosed with acute bilateral lower lobe pneumonia with dyspnea and hypoxia and persistent cough  Patient also with a history of lung cancer and lung resection  This is a new issue that will require additional planned work-up and treatment in a hospitalized setting  As may have been required as part of this evaluation, clinical laboratory test, radiology imaging and medical testing (I e  EKG) were ordered as necessitated by the patient's presentation  I independently reviewed these studies, imaging and testing  This patient's case is considered to be a considerable risk secondary to the above listed disease process and poses a threat to the patient's well-being and baseline function  Further in-patient diagnostic testing and management, which may include the administration of parenteral medications, is required           Amount and/or Complexity of Data Reviewed  Clinical lab tests: ordered and reviewed  Tests in the radiology section of CPT®: ordered and reviewed  Discuss the patient with other providers: yes    Risk of Complications, Morbidity, and/or Mortality  Presenting problems: moderate  Diagnostic procedures: moderate  Management options: moderate    Patient Progress  Patient progress: stable      Disposition  Final diagnoses:   Hypoxia   Dyspnea   Bilateral pneumonia     Time reflects when diagnosis was documented in both MDM as applicable and the Disposition within this note     Time User Action Codes Description Comment    1/13/2022 11:46 PM Sin Huerta [R09 02] Hypoxia     1/13/2022 11:46 PM Sin Huerta [R06 00] Dyspnea     1/13/2022 11:46 PM Sin Huerta [J18 9] Bilateral pneumonia       ED Disposition     ED Disposition Condition Date/Time Comment    Admit Stable Thu Jan 13, 2022 11:46 PM         Follow-up Information    None         Patient's Medications    No medications on file       No discharge procedures on file      PDMP Review     None          ED Provider  Electronically Signed by           Gerald Miranda, DO  01/13/22 [address], DO  01/14/22 6939

## 2022-01-14 NOTE — ASSESSMENT & PLAN NOTE
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

## 2022-01-14 NOTE — ASSESSMENT & PLAN NOTE
· History NSCLC adenocarcinoma moderately differentiated of left lower lobe diagnosed 06/25/2018  · Left lower lobe lobectomy with mediastinal node sampling 08/22/2018  · Current therapy observation with surveillance imaging  · No adjuvant treatment  · Has follow-up appointment scheduled, transitioning care to Dr Trish Herron

## 2022-01-14 NOTE — DISCHARGE SUMMARY
[address]  Discharge- Svratka 1960, 64 y o  female MRN: [id number]  Unit/Bed#: ED 12 Encounter: [id number]  Primary Care Provider: No primary care provider on file  Date and time admitted to hospital: 1/13/2022  9:32 PM    * Acute respiratory failure with hypoxia (HCC)  Assessment & Plan  · POA with 1 month of worsening dyspnea, CT with bilateral GGO  · History of COVID 11/17 with very mild symptoms  · Negative COVID on admission 1/13  · Trial glucocorticoid Solu-Medrol 40 mg IV q 8  · Pulmonary evaluation in 2018 with normal PFTs  · Intermittent tobacco abuse over the years, probably less than 10 pack years altogether  · 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, 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  · 1/14:  Procalcitonin is negative x2  Not bacterial infection  Probably superimposed viral infection on previous COVID-19 pneumonia  Also associated with occupational exposure  And asthma exacerbation  Will do ambulatory oxygen requirement study qualify for home oxygen  place on steroid taper at home along with nebulizer regimen  No antibiotics at this time  Will need to be off from work for at least 7-10 days  Needs outpatient follow-up with pulmonology    Mild intermittent asthma with acute exacerbation  Assessment & Plan  Currently in exacerbation  Will treat with nebulizer regimen and course of steroids    Hepatic cyst  Assessment & Plan  · Hepatic cyst incidentally noted on CT scan  · Outpatient follow-up  4 7 cm lobulated cyst in the hepatic dome    Additional scattered subcentimeter hepatic hypodensities are stable        History of lung cancer  Assessment & Plan  · History NSCLC adenocarcinoma moderately differentiated of left lower lobe diagnosed 06/25/2018  · Left lower lobe lobectomy with mediastinal node sampling 08/22/2018  · Current therapy observation with surveillance imaging  · No adjuvant treatment  · Has follow-up appointment scheduled, transitioning care to Dr Deja Russell      Discharging Physician / Practitioner: Massiel Jon MD  PCP: No primary care provider on file  Admission Date:   Admission Orders (From admission, onward)     Ordered        01/13/22 9713  Inpatient Admission  Once                      Discharge Date: 01/14/22    Medical Problems             Resolved Problems  Date Reviewed: 1/14/2022    None                Consultations During Hospital Stay:  · none    Procedures Performed:   · none    Significant Findings / Test Results:   CTA ED chest PE Study    Result Date: 1/13/2022  Impression: 1  No evidence of pulmonary embolus  2   Increased patchy groundglass opacities bilaterally with interlobular septal thickening suspicious for multifocal infection including viral pneumonia  Differential includes pulmonary edema  3   Trace left pleural effusion  4   Stable bilateral hilar and mediastinal lymphadenopathy  Follow-up is recommended  Workstation performed: [id number]     Incidental Findings:   · none    Test Results Pending at Discharge (will require follow up):   · none     Outpatient Tests Requested:  · none  Outpatient follow-up with pulmonology  Complications:  None     Reason for Admission:  Shortness of breath    Hospital Course:     Irma Fleischer is a 64 y o  female patient who originally presented to the hospital on 1/13/2022 due to acute respiratory failure with hypoxia due to recent COVID-19 infection/pneumonia with superimposed viral pneumonia  No bacterial pneumonia at this time  Will treat with course of steroids, nebulizer regimen and also will try to provide her with home oxygen after doing a ambulatory oxygen requirement study today    Needs outpatient follow-up with pulmonology and needs at least 10-14 day break from work until she is weaned off of oxygen    The patient, initially admitted to the hospital as inpatient, was discharged earlier than expected given the following: Rapid improvement  Please see above list of diagnoses and related plan for additional information  Condition at Discharge: good     Discharge Day Visit / Exam:     Subjective:  Patient denies any chest pain  Feels her shortness of breath slowly improving  Vitals: Blood Pressure: 137/70 (01/14/22 0439)  Pulse: 74 (01/14/22 0439)  Temperature: 98 5 °F (36 9 °C) (01/13/22 2119)  Temp Source: Temporal (01/13/22 2119)  Respirations: 16 (01/14/22 0439)  Height: 5' 2" (157 5 cm) (01/13/22 2119)  Weight - Scale: 61 2 kg (135 lb) (01/13/22 2119)  SpO2: 97 % (01/14/22 0439)  Exam:   Physical Exam  Vitals and nursing note reviewed  Constitutional:       Appearance: Normal appearance  HENT:      Head: Normocephalic and atraumatic  Right Ear: External ear normal       Left Ear: External ear normal       Nose: Nose normal       Mouth/Throat:      Pharynx: Oropharynx is clear  Eyes:      Pupils: Pupils are equal, round, and reactive to light  Cardiovascular:      Rate and Rhythm: Normal rate and regular rhythm  Heart sounds: Normal heart sounds  Pulmonary:      Effort: Pulmonary effort is normal       Breath sounds: Wheezing present  Comments: Moderate air entry bilaterally with decreased breath sounds on the bases  Abdominal:      General: Bowel sounds are normal       Palpations: Abdomen is soft  Tenderness: There is no abdominal tenderness  Musculoskeletal:         General: Normal range of motion  Cervical back: Normal range of motion and neck supple  Skin:     General: Skin is warm and dry  Capillary Refill: Capillary refill takes less than 2 seconds  Neurological:      General: No focal deficit present  Mental Status: She is alert and oriented to person, place, and time     Psychiatric:         Mood and Affect: Mood normal          Discussion with Family: None    Discharge instructions/Information to patient and family:   See after visit summary for information provided to patient and family  Provisions for Follow-Up Care:  See after visit summary for information related to follow-up care and any pertinent home health orders  Disposition:     Home    For Discharges to [facility] SNF:   · Not Applicable to this Patient - Not Applicable to this Patient    Planned Readmission:  None     Discharge Statement:  I spent 35 minutes discharging the patient  This time was spent on the day of discharge  I had direct contact with the patient on the day of discharge  Greater than 50% of the total time was spent examining patient, answering all patient questions, arranging and discussing plan of care with patient as well as directly providing post-discharge instructions  Additional time then spent on discharge activities  Discharge Medications:  See after visit summary for reconciled discharge medications provided to patient and family        ** Please Note: This note has been constructed using a voice recognition system **

## 2022-01-14 NOTE — H&P
[address] 1960, 64 y o  female MRN: [id number]  Unit/Bed#: ED 12 Encounter: [id number]  Primary Care Provider: No primary care provider on file  Date and time admitted to hospital: 1/13/2022  9:32 PM    * Acute respiratory failure with hypoxia (HCC)  Assessment & Plan  · POA with 1 month of worsening dyspnea, CT with bilateral GGO  · History of COVID 11/17 with very mild symptoms  · Negative COVID on admission 1/13  · PCP treated with prednisone and clindamycin 3 weeks ago without improvement  · Lung exam with bilateral fine rhonchi  · Check CRP, no leukocytosis  · Empiric ceftriaxone, trend procalcitonin  · Questionable post COVID interstitial pneumonia  · Trial glucocorticoid Solu-Medrol 40 mg IV q 8  · Pulmonary evaluation in 2018 with normal PFTs  · Intermittent tobacco abuse over the years, probably less than 10 pack years altogether  · 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, 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    Hepatic cyst  Assessment & Plan  · Hepatic cyst incidentally noted on CT scan  · Outpatient follow-up  4 7 cm lobulated cyst in the hepatic dome  Additional scattered subcentimeter hepatic hypodensities are stable        History of lung cancer  Assessment & Plan  · History NSCLC adenocarcinoma moderately differentiated of left lower lobe diagnosed 06/25/2018  · Left lower lobe lobectomy with mediastinal node sampling 08/22/2018  · Current therapy observation with surveillance imaging  · No adjuvant treatment  · Has follow-up appointment scheduled, transitioning care to Dr Philip Magdaleno    VTE Pharmacologic Prophylaxis: VTE Score: 5 Moderate Risk (Score 3-4) - Pharmacological DVT Prophylaxis Ordered: enoxaparin (Lovenox)    Code Status: Level 1 - Full Code Anticipated Length of Stay: Patient will be admitted on an inpatient basis with an anticipated length of stay of greater than 2 midnights secondary to Respiratory failure  Total Time for Visit, including Counseling / Coordination of Care: 30 minutes Greater than 50% of this total time spent on direct patient counseling and coordination of care  Chief Complaint:  Dyspnea    History of Present Illness:  Ching Smith is a 64 y o  female with a PMH of lung cancer status post left lobe lobectomy in 2018, not vaccinated for COVID and reports COVID infection 11/17 that was very mild and patient reports being fairly active during her quarantine time  Over the past month since middle of December she has developed worsening dyspnea and nonproductive cough  Presented to the emergency department 1 week prior to admission and evaluated with CT chest suspicious for possible infectious process with mediastinal lymphadenopathy and encouraged follow up with Oncology, she made the appointment  Today patient returns at request of her PCP for CTA to evaluate for pulmonary embolism as she was treated with prednisone and clindamycin without improvement  CTA chest today reveals worsening patchy ground-glass bilateral opacities with interlobular septal thickening and stable bilateral hilar and mediastinal lymphadenopathy  On exam patient appears quite ill and dyspneic with fine rhonchi throughout lung fields  Admitted for hypoxia requiring 2 L of supplemental oxygen without history of oxygen dependence  Very questionable relationship to COVID as patient was COVID positive 11/17 and within the next 30 days developed respiratory symptoms of now worsened to appear as a COVID pneumonia  Review of Systems:  Review of Systems   Constitutional: Positive for fatigue  Negative for chills and fever  HENT: Negative for ear pain and sore throat  Eyes: Negative for pain and visual disturbance     Respiratory: Positive for cough and shortness of breath  Negative for wheezing  Cardiovascular: Negative for chest pain and palpitations  Gastrointestinal: Negative for abdominal pain and vomiting  Genitourinary: Negative for dysuria and hematuria  Musculoskeletal: Positive for arthralgias and myalgias  Negative for back pain  Skin: Negative for color change and rash  Neurological: Negative for seizures and syncope  All other systems reviewed and are negative  Past Medical and Surgical History:   Past Medical History:   Diagnosis Date    Lung cancer ([facility] [address] )     NICKO (obstructive sleep apnea)     Ovarian cyst     Psychiatric disorder     Anxiety, depression       Past Surgical History:   Procedure Laterality Date    CARPAL TUNNEL RELEASE Right     CERVICAL CONE BIOPSY      CHOLECYSTECTOMY      COLONOSCOPY      EGD      ENDOMETRIAL ABLATION      LEFT OOPHORECTOMY  2007    LUNG LOBECTOMY Left 2018    NASAL SEPTUM SURGERY      TUBAL LIGATION         Meds/Allergies:  Prior to Admission medications    Not on File     I have reviewed home medications with patient personally  Allergies:    Allergies   Allergen Reactions    Amoxicillin Hives, Rash and Shortness Of Breath    Azithromycin Shortness Of Breath    Bupropion Other (See Comments) and Shortness Of Breath    Cucurbita Anaphylaxis    Guaifenesin Shortness Of Breath     hives and sob      Ketorolac Shortness Of Breath    Ketorolac Tromethamine Shortness Of Breath    Levofloxacin Hives and Shortness Of Breath    Nitrofurantoin Hives, Rash and Shortness Of Breath    Sertraline Other (See Comments) and Shortness Of Breath    Shellfish-Derived Products - Food Allergy Shortness Of Breath    Sulfamethoxazole-Trimethoprim Rash and Shortness Of Breath    Medical Tape Rash    Other Rash       Social History:  Marital Status: /Civil Union   Occupation: [employer] department  Patient Pre-hospital Living Situation: Home  Patient Pre-hospital Level of Mobility: walks  Patient Pre-hospital Diet Restrictions: none   Substance Use History:   Social History     Substance and Sexual Activity   Alcohol Use Not Currently     Social History     Tobacco Use   Smoking Status Former Smoker    Types: Cigarettes    Quit date: 2008    Years since quittin 0   Smokeless Tobacco Never Used     Social History     Substance and Sexual Activity   Drug Use Never       Family History:  Family History   Problem Relation Age of Onset    Depression Mother     Alcohol abuse Father     Skin cancer Father     Migraines Sister     Hypertension Brother        Physical Exam:     Vitals:   Blood Pressure: 137/70 (22)  Pulse: 74 (22)  Temperature: 98 5 °F (36 9 °C) (22)  Temp Source: Temporal (22)  Respirations: 16 (22)  Height: 5' 2" (157 5 cm) (22)  Weight - Scale: 61 2 kg (135 lb) (22)  SpO2: 97 % (22)    Physical Exam  Vitals and nursing note reviewed  Constitutional:       General: She is not in acute distress  Appearance: She is well-developed  She is ill-appearing  HENT:      Head: Normocephalic and atraumatic  Mouth/Throat:      Mouth: Mucous membranes are dry  Eyes:      Conjunctiva/sclera: Conjunctivae normal    Cardiovascular:      Rate and Rhythm: Normal rate and regular rhythm  Heart sounds: No murmur heard  Pulmonary:      Effort: Pulmonary effort is normal  No respiratory distress  Breath sounds: Rhonchi and rales present  Chest:      Chest wall: Tenderness present  Comments: Tenderness palpation left lower sternal aspect  Abdominal:      Palpations: Abdomen is soft  Tenderness: There is no abdominal tenderness  Musculoskeletal:      Cervical back: Neck supple  Skin:     General: Skin is warm and dry  Capillary Refill: Capillary refill takes less than 2 seconds  Neurological:      General: No focal deficit present  Mental Status: She is alert and oriented to person, place, and time  Psychiatric:         Mood and Affect: Mood normal          Behavior: Behavior normal          Additional Data:     Lab Results:  Results from last 7 days   Lab Units 01/13/22  2201   WBC Thousand/uL 9 23   HEMOGLOBIN g/dL 12 3   HEMATOCRIT % 37 5   PLATELETS Thousands/uL 344   NEUTROS PCT % 78*   LYMPHS PCT % 10*   MONOS PCT % 5   EOS PCT % 6     Results from last 7 days   Lab Units 01/13/22  2201   SODIUM mmol/L 137   POTASSIUM mmol/L 4 1   CHLORIDE mmol/L 103   CO2 mmol/L 25   BUN mg/dL 14   CREATININE mg/dL 0 89   ANION GAP mmol/L 9   CALCIUM mg/dL 8 5   GLUCOSE RANDOM mg/dL 100                 Results from last 7 days   Lab Units 01/13/22  2355 01/13/22  2201   LACTIC ACID mmol/L 0 7  --    PROCALCITONIN ng/ml  --  0 08       Imaging: Reviewed radiology reports from this admission including: chest CT scan  CTA ED chest PE Study   Final Result by Jose Davis MD (01/13 2332)      1  No evidence of pulmonary embolus  2   Increased patchy groundglass opacities bilaterally with interlobular septal thickening suspicious for multifocal infection including viral pneumonia  Differential includes pulmonary edema  3   Trace left pleural effusion  4   Stable bilateral hilar and mediastinal lymphadenopathy  Follow-up is recommended  Workstation performed: [id number]             EKG and Other Studies Reviewed on Admission:   · All    ** Please Note: This note has been constructed using a voice recognition system   **

## 2022-01-14 NOTE — UTILIZATION REVIEW
Initial Clinical Review    Admission: Date/Time/Statement:   Admission Orders (From admission, onward)     Ordered        01/13/22 2354  Inpatient Admission  Once                      Orders Placed This Encounter   Procedures    Inpatient Admission     Standing Status:   Standing     Number of Occurrences:   1     Order Specific Question:   Level of Care     Answer:   Med Surg [16]     Order Specific Question:   Estimated length of stay     Answer:   More than 2 Midnights     Order Specific Question:   Certification     Answer:   I certify that inpatient services are medically necessary for this patient for a duration of greater than two midnights  See H&P and MD Progress Notes for additional information about the patient's course of treatment  ED Arrival Information     Expected Arrival Acuity    - 1/13/2022 21:07 Emergent         Means of arrival Escorted by Service Admission type    [facility] Emergency         Arrival complaint    chest pain, sob, back pain        Chief Complaint   Patient presents with    Shortness of Breath     Pt reports ongoing pain at bottom of rib cage and worsenign SOB, appt with oncology on Monday        Initial Presentation:  64year old female, presented to [facility] ED via personal car from home with family member  C/C SOB worsening over 1 month  Admit inpatient class acute respitory failure with hypoxia  Initial RA 02 Sat was 91%, 2L NC applied  CT revealed GGO h/o covid 11/17 unvaccinated  , covid, neg on admission,  Exam reveals bilateral rhonchi & rales, tenderness to palpation left lower sternal aspect , IV abx, IV  trial glucocorticoid steroids,  ? post covid  Interstitial pneumonia  trend procalcitonin,  CBC, Elevated bnp, crp, pulmonary consult  [facility] Lung CA S/P lobectomy 2018  F/u w/oncology  Hepatic cyst incidentally noted on CT scan, Outpatient follow-up  Date: 1/14/22  Day 2: Procalcitonin is negative x2  Probably superimposed viral infection on previous COVID-19 pneumonia  Also associated with occupational exposure,  asthma exacerbation  ambulatory oxygen requirement study qualify for home oxygen  place on steroid taper at home along with nebulizer regimen  No antibiotics at this time  Needs outpatient follow-up with pulmonology        Pulmonary Consult: Hypoxia, abn chest imaging, h/o lung CA  : history of non-small cell lung cancer status post lobectomy left lower lung in 2018, history of COVID-19 pneumonia in November 2021 presenting to the emergency room with several week history of shortness of breath, cough, chest tightness with ensuing workup to include CT of chest showing diffuse ground-glass opacities and interlobular septal thickening worsening over the past week  Overall I suspect etiology patient's condition is viral/atypical bacterial versus inflammatory  Differential diagnosis is broad and includes eosinophilic lung disease, hypersensitivity pneumonitis, respiratory viral pathogens, less likely sarcoidosis, less likely malignancy, drug exposure, etc  Recommendations : Continue supplemental oxygen wean as possible maintain saturations greater than 90%  Agree with the continuation of steroids  If discharged home with discharge to 60 mg prednisone and taper by 10 mg every 3 days until off  Will need outpatient pulmonary follow-up, Continue bronchodilators here  Would discharge with p r n  Bronchodilator (albuterol)  Could also consider trial of inhaled corticosteroid (such as Flovent, Pulmicort)  Have repeated at COVID-19 PCR, Have added RP 2 panel as well, Will need repeat imaging within the next couple of weeks  At least chest x-ray  Repeat CT chest in 6-8 weeks  If unimproved after above measures would recommend a bronchoscopy with the BAL and further evaluation for inflammatory lung disease       ED Triage Vitals   Temperature Pulse Respirations Blood Pressure SpO2   01/13/22 2119 01/13/22 2119 01/13/22 2119 01/13/22 2119 01/13/22 2119   98 5 °F (36 9 °C) 97 20 142/74 91 %      Temp Source Heart Rate Source Patient Position - Orthostatic VS BP Location FiO2 (%)   01/13/22 2119 01/13/22 2119 01/13/22 2119 01/13/22 2119 --   Temporal Monitor Lying Right arm       Pain Score       01/14/22 0439       No Pain          Wt Readings from Last 1 Encounters:   01/13/22 61 2 kg (135 lb)     Additional Vital Signs:   Date/Time Temp Pulse Resp BP MAP (mmHg) SpO2 Calculated FIO2 (%) - Nasal Cannula Nasal Cannula O2 Flow Rate (L/min) O2 Device Patient Position - Orthostatic VS   01/14/22 1132 98 1 °F (36 7 °C) 66 16 100/58 74 98 % 28 2 L/min Nasal cannula Sitting   01/14/22 0439 -- 74 16 137/70 -- 97 % 32 3 L/min Nasal cannula Lying   01/14/22 0245 -- 86 16 135/85 -- 95 % -- -- -- --   01/14/22 0045 -- 97 22 137/70 -- 91 % 32 3 L/min Nasal cannula Lying   01/13/22 2202 -- -- -- -- -- -- -- -- None (Room air) --     Pertinent Labs/Diagnostic Test Results:   1/13/22 CTA ED chest PE Study: MEDIASTINUM AND ALESSANDRO:  Stable bilateral hilar and mediastinal lymphadenopathy   For example: -1 5 x 1 8 cm left hilar lymph node (6/91)  -1 2 x 1 4 cm right hilar lymph node (6/110)  -1 1 x 2 1 cm subcarinal lymph node (6/88)  No evidence of pulmonary embolus  Increased patchy groundglass opacities bilaterally with interlobular septal thickening suspicious for multifocal infection including viral pneumonia   Differential includes pulmonary edema    Trace left pleural effusion   Stable bilateral hilar and mediastinal lymphadenopathy   Follow-up is recommended     Results from last 7 days   Lab Units 01/13/22 2236   SARS-COV-2  Negative     Results from last 7 days   Lab Units 01/13/22  2201   WBC Thousand/uL 9 23   HEMOGLOBIN g/dL 12 3   HEMATOCRIT % 37 5   PLATELETS Thousands/uL 344   NEUTROS ABS Thousands/µL 7 20         Results from last 7 days   Lab Units 01/13/22  2201   SODIUM mmol/L 137   POTASSIUM mmol/L 4 1   CHLORIDE mmol/L 103   CO2 mmol/L 25   ANION GAP mmol/L 9   BUN mg/dL 14   CREATININE mg/dL 0 89   EGFR ml/min/1 73sq m 70   CALCIUM mg/dL 8 5   MAGNESIUM mg/dL 2 1             Results from last 7 days   Lab Units 01/13/22  2201   GLUCOSE RANDOM mg/dL 100       Results from last 7 days   Lab Units 01/14/22  0109   PH KAITLIN  7 376   PCO2 KAITLIN mm Hg 43 9   PO2 KAITLIN mm Hg 35 6   HCO3 KAITLIN mmol/L 25 2   BASE EXC KAITLIN mmol/L -0 2   O2 CONTENT KAITLIN ml/dL 11 6   O2 HGB, VENOUS % 64 4         Results from last 7 days   Lab Units 01/14/22  0440 01/13/22  2201   PROCALCITONIN ng/ml 0 08 0 08     Results from last 7 days   Lab Units 01/13/22  2355   LACTIC ACID mmol/L 0 7             Results from last 7 days   Lab Units 01/14/22  0109   NT-PRO BNP pg/mL 205*     Results from last 7 days   Lab Units 01/14/22  0109   CRP mg/L 29 6*           Results from last 7 days   Lab Units 01/13/22  2236   INFLUENZA A PCR  Negative   INFLUENZA B PCR  Negative   RSV PCR  Negative         ED Treatment:   Medication Administration from 01/13/2022 2107 to 01/14/2022 1102       Date/Time Order Dose Route Action     01/13/2022 2202 morphine injection 2 mg 2 mg Intravenous Given     01/13/2022 2203 benzonatate (TESSALON PERLES) capsule 200 mg 200 mg Oral Given     01/13/2022 2235 iohexol (OMNIPAQUE) 350 MG/ML injection (SINGLE-DOSE) 85 mL 85 mL Intravenous Given     01/14/2022 0118 cefTRIAXone (ROCEPHIN) IVPB (premix in dextrose) 1,000 mg 50 mL 1,000 mg Intravenous New Bag     01/14/2022 0917 enoxaparin (LOVENOX) subcutaneous injection 40 mg 40 mg Subcutaneous Given     01/14/2022 6599 methylPREDNISolone sodium succinate (Solu-MEDROL) injection 40 mg 40 mg Intravenous Given     01/14/2022 0916 pantoprazole (PROTONIX) EC tablet 40 mg 40 mg Oral Given        Past Medical History:   Diagnosis Date    Lung cancer ([address] )     NICKO (obstructive sleep apnea)     Ovarian cyst     Psychiatric disorder     Anxiety, depression     Present on Admission:   Acute respiratory failure with hypoxia ([facility] [address] )   Hepatic cyst      Admitting Diagnosis: SOB (shortness of breath) [R06 02]  Age/Sex: 64 y o  female  Admission Orders:oob, scd, nc 02,   Scheduled Medications:  enoxaparin, 40 mg, Subcutaneous, Daily  ipratropium-albuterol, 3 mL, Nebulization, Q6H  methylPREDNISolone sodium succinate, 40 mg, Intravenous, Q8H ZENIA  pantoprazole, 40 mg, Oral, Early Morning      Continuous IV Infusions:none     PRN Meds:  acetaminophen, 650 mg, Oral, Q6H PRN  albuterol, 2 puff, Inhalation, Q4H PRN  benzonatate, 100 mg, Oral, TID PRN        IP CONSULT TO PULMONOLOGY    Network Utilization Review Department  ATTENTION: Please call with any questions or concerns to [phone] and carefully listen to the prompts so that you are directed to the right person  All voicemails are confidential   [facility] all requests for admission clinical reviews, approved or denied determinations and any other requests to dedicated fax number below belonging to the campus where the patient is receiving treatment   List of dedicated fax numbers for the Facilities:  [address] DENIALS (Administrative/Medical Necessity) [phone]   [address] (Maternity/NICU/Pediatrics) [phone] [address]  [address] [place] [address] [address] [place] [address] [address] [facility] [facility] [phone]

## 2022-01-14 NOTE — ASSESSMENT & PLAN NOTE
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

## 2022-01-14 NOTE — RESPIRATORY THERAPY NOTE
Home Oxygen Qualifying Test       Patient name: Nico Cota        : 1960   Date of Test:  2022  Diagnosis:      Home Oxygen Test:    **Medicare Guidelines require item(s) 1-5 on all ambulatory patients or 1 and 2 on non-ambulatory patients  1   Baseline SPO2 on Room Air at rest  91%  2   SPO2 during exercise on Room Air 85 %  During exercise monitor SpO2  If SPO2 increases >=89% with ambulation do not add supplemental             oxygen  If <= 88% on room air add O2 via NC and titrate patient  Patient must be ambulated with O2 and titrated to > 88% with exertion  3   SPO2 on Oxygen at rest 96 % 2 lpm     4   SPO2 during exercise on Oxygen  92% a liter flow of 2 lpm     5   Exercise performed:          walking, duration 5 (min)          [x]  Supplemental Home Oxygen is indicated  []  Client does not qualify for home oxygen        Respiratory Additional Notes-     Howie, RT

## 2022-01-14 NOTE — CASE MANAGEMENT
Case Management Discharge Planning Note    Patient name Marylee Aly  Location ED 12/ED 12 MRN [id number]  : 1960 Date 2022       Current Admission Date: 2022  Current Admission Diagnosis:Acute respiratory failure with hypoxia [facility])   Patient Active Problem List    Diagnosis Date Noted    Acute respiratory failure with hypoxia ([address] ) 2022    History of lung cancer 2022    Hepatic cyst 2022    Mild intermittent asthma with acute exacerbation 2022      LOS (days): 1  Geometric Mean LOS (GMLOS) (days):   Days to GMLOS:     OBJECTIVE:  Risk of Unplanned Readmission Score: 9         Current admission status: Inpatient   Preferred Pharmacy:   [address] [place] [address]  Phone: [phone] Fax: [phone]    Primary Care Provider: No primary care provider on file  Primary Insurance: BLUE CROSS  Secondary Insurance:     DISCHARGE DETAILS:              pt requires home nebulizer, order placed in [place]         Pt requires O2 2L with exertion  Pt does not have a preference to any DME company  CM placing order for O2 in [place]  Awaiting reply     [facility] will provide pt with home o2 and nebulizer  Cm delivered nebulizer and O2 tank to pts bedside  Freedom of choice was provided in regards to needing a home medical equipment company, pt does not have preference  Pt is aware that we frequently utilized [employer] as we have a working relationship with this company  Patients choice is to use [employer]

## 2022-01-14 NOTE — UTILIZATION REVIEW
Inpatient Admission Authorization Request   NOTIFICATION OF INPATIENT ADMISSION/INPATIENT AUTHORIZATION REQUEST   SERVICING FACILITY:   [address] [place] [address]  Cuong Lindo [address] [place] [address], [address]  Tax ID: [id number]  NPI: [id number]  Place of Service: Inpatient [facility]  [address]  Place of Service Code: 24     ATTENDING PROVIDER:  Attending Name and NPI#: Mitchell Castanon Md [[id number]]  Address: [facility]  Cuong Lindo [address] [place] [address], [facility] Krystina Carrasquillo  Phone: [phone]     UTILIZATION REVIEW CONTACT:  Amirah Magana Utilization   Network Utilization Review Department  Phone: [phone]  Fax [phone]  Email: [email or web address]     PHYSICIAN ADVISORY SERVICES:  FOR RPUA-XS-RNRB REVIEW - MEDICAL NECESSITY DENIAL  Phone: [phone]  Fax: [phone]  Email: [email or web address]     TYPE OF REQUEST:  Inpatient Status     ADMISSION INFORMATION:  ADMISSION DATE/TIME: 1/13/22 11:55 PM  PATIENT DIAGNOSIS CODE/DESCRIPTION:  SOB (shortness of breath) [R06 02]  DISCHARGE DATE/TIME: No discharge date for patient encounter  DISCHARGE DISPOSITION (IF DISCHARGED): Home/Self Care     IMPORTANT INFORMATION:  Please contact the Amirah Magana directly with any questions or concerns regarding this request  Department voicemails are confidential     Send requests for admission clinical reviews, concurrent reviews, approvals, and administrative denials due to lack of clinical to fax [phone]

## 2022-01-14 NOTE — ASSESSMENT & PLAN NOTE
· Hepatic cyst incidentally noted on CT scan  · Outpatient follow-up  4 7 cm lobulated cyst in the hepatic dome    Additional scattered subcentimeter hepatic hypodensities are stable

## 2022-01-14 NOTE — ASSESSMENT & PLAN NOTE
· History NSCLC adenocarcinoma moderately differentiated of left lower lobe diagnosed 06/25/2018  · Left lower lobe lobectomy with mediastinal node sampling 08/22/2018  · Current therapy observation with surveillance imaging  · No adjuvant treatment  · Has follow-up appointment scheduled, transitioning care to Dr Garo Sierra

## 2022-01-14 NOTE — CONSULTS
Consultation - Pulmonary Medicine   [facility] 64 y o  female MRN: [id number]  Unit/Bed#: ED 12 Encounter: [id number]      Physician Requesting Consult: Jonel Oneil   Reason for Consult: Hypoxia, abnormal chest imaging, history of lung cancer     Assessment/Plan:    77-year-old woman with history of non-small cell lung cancer status post lobectomy left lower lung in 2018, history of COVID-19 pneumonia in November 2021 presenting to the emergency room with several week history of shortness of breath, cough, chest tightness with ensuing workup to include CT of chest showing diffuse ground-glass opacities and interlobular septal thickening worsening over the past week  Overall I suspect etiology patient's condition is viral/atypical bacterial versus inflammatory  Differential diagnosis is broad and includes eosinophilic lung disease, hypersensitivity pneumonitis, respiratory viral pathogens, less likely sarcoidosis, less likely malignancy, drug exposure, much less likely vasculitis/dah  Problems     1  Acute hypoxic respiratory failure  2  Abnormal chest CT with ground-glass opacities with interlobular septal thickening  3  Cough  4  Left-sided chest pain/back pain  5  Questionable sleep apnea, patient never underwent sleep study  6  History of COVID-19 pneumonia, November 2021    Recommendations   1  Continue supplemental oxygen and wean as possible to maintain saturations greater than 90%  2  Agree with the continuation of steroids  If discharged home with discharge to 60 mg prednisone and taper by 10 mg every 3 days until off   3  Will need outpatient pulmonary follow-up  4  Continue bronchodilators here  Would discharge with p r n  Bronchodilator (albuterol)  Could also consider trial of inhaled corticosteroid (such as Flovent, Pulmicort)  5  Have ordered a repeat a COVID-19 PCR  6  Have added RP 2 panel as well  7  Will need repeat imaging within the next couple of weeks    At least chest x-ray   Repeat CT chest in 6-8 weeks  8  If unimproved after above measures would recommend a bronchoscopy with the BAL and further evaluation for inflammatory lung diseases  ______________________________________________________________________    HPI:    Bertha Brown is a 64 y o  female with a history of NSCLC adenoca involving LLL dx 6/2018 and treated with LLL lobectomy (no adjuvant chemo), hx of covid-19 in November, 2021, NICKO, anxiety/depression presented to the ED today due to R sided chest pain and sob  Had a similar presentation approximately 1 week ago  At that time CT was performed that showed no evidence of pulmonary embolism but bilateral ground-glass opacities  VS in ED were essentially normal (mild hypertension), sat 91% RA  She was placed on 2L NC    Covid-19/influena/RSV pcr negative  BMP, CBC were normal  CTA performed and showed no evidence of PE, but increased b/l GGO with interlobular septal thickening, small Left trace pleural effusion and stable mediastinal adenopathy  She was admitted and has been prescribed ceftriaxone and solumedrol 40 mg q8 hours  The patient was seen this morning  States that her symptoms actually began a few weeks ago  She was previously for clindamycin and prednisone by her primary care physician  States that she only took the prednisone her approximately 3 days  She thinks that it helped but she stopped taking it because of increase gastroesophageal reflux and also tongue dryness  States now she has had persistent mostly nonproductive cough (does during long coughing episode did have some blood streaked sputum, no panchito hemoptysis)  She has had some subjective fevers and also thoracic pain some/left-sided back pain associated with coughing  She works in a [employer] and has some exposures to fumes  States that she exerts herself physically every day at work  She denies any sick contacts  She is on vaccinated against COVID-19    She quit smoking in  (approximately 10 pack year history)  She denies any systemic symptoms to include arthralgias, skin lesions or rashes, nausea, vomiting, abdominal pain  She does have chronic postnasal drip  Overall feeling a little bit better since being admitted to the hospital   No acute complaints during my assessment this morning  PFT results:  Per care everywhere [facility] note 2018  "PFTs 18:  normal       PET-CT 18:    CT guided biopsy 18:      Spirometry 06/15/18: normal"      Review of Systems:    Full 12 point review of systems was performed  Aside from what was mentioned in the HPI, it is otherwise negative  Historical Information   Past Medical History:   Diagnosis Date    Lung cancer ([address] )     NICKO (obstructive sleep apnea)     Ovarian cyst     Psychiatric disorder     Anxiety, depression     Past Surgical History:   Procedure Laterality Date    CARPAL TUNNEL RELEASE Right     CERVICAL CONE BIOPSY      CHOLECYSTECTOMY      COLONOSCOPY      EGD      ENDOMETRIAL ABLATION      LEFT OOPHORECTOMY  2007    LUNG LOBECTOMY Left 2018    NASAL SEPTUM SURGERY      TUBAL LIGATION       Social History   Social History     Substance and Sexual Activity   Alcohol Use Not Currently     Social History     Tobacco Use   Smoking Status Former Smoker    Types: Cigarettes    Quit date: 2008    Years since quittin 0   Smokeless Tobacco Never Used   10 pack year history     Occupational history:  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, 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  Family History:   Family History   Problem Relation Age of Onset    Depression Mother     Alcohol abuse Father     Skin cancer Father    Paul Abt Migraines Sister     Hypertension Brother        Medications: The patient's active and prehospital medications were reviewed    Current Facility-Administered Medications   Medication Dose Route Frequency Provider Last Rate    acetaminophen  650 mg Oral Q6H PRN Summer S Clarice, CRNP      albuterol  2 puff Inhalation Q4H PRN Summer S Clarice, CRNP      benzonatate  100 mg Oral TID PRN Summer S Clarice, CRNP      enoxaparin  40 mg Subcutaneous Daily Summer S Clarice, CRNP      methylPREDNISolone sodium succinate  40 mg Intravenous Q8H [facility] Summer S Clarice, CRNP      pantoprazole  40 mg Oral Early Morning Summer S Clarice, CRNP           PhysicalExamination:  Vitals:   Vitals:    01/13/22 2119 01/14/22 0045 01/14/22 0245 01/14/22 0439   BP: 142/74 137/70 135/85 137/70   BP Location: Right arm Left arm  Left arm   Pulse: 97 97 86 74   Resp: 20 22 16 16   Temp: 98 5 °F (36 9 °C)      TempSrc: Temporal      SpO2: 91% 91% 95% 97%   Weight: 61 2 kg (135 lb)      Height: 5' 2" (1 575 m)        Body mass index is 24 69 kg/m²  Temp  Min: 98 5 °F (36 9 °C)  Max: 98 5 °F (36 9 °C)       SpO2: 97 %,   SpO2 Activity: At Rest,   O2 Device: Nasal cannula      /70 (BP Location: Left arm)   Pulse 74   Temp 98 5 °F (36 9 °C) (Temporal)   Resp 16   Ht 5' 2" (1 575 m)   Wt 61 2 kg (135 lb)   SpO2 97%   BMI 24 69 kg/m²     General Appearance:    Alert, cooperative, no distress, appears stated age, tired appearing, coughing at times during assessment      Head:    Normocephalic, without obvious abnormality, atraumatic   Eyes:    PERRL, conjunctiva/corneas clear, EOM's intact, fundi     benign, both eyes   Ears:    Normal TM's and external ear canals, both ears   Nose:   Nares normal, septum midline, mucosa normal, no drainage     or sinus tenderness   Throat:   Lips, mucosa, and tongue normal; teeth and gums normal   Neck:   Supple, symmetrical, trachea midline, no adenopathy;     thyroid:  no enlargement/tenderness/nodules; no carotid    bruit or JVD   Back:     Symmetric, no curvature, ROM normal, no CVA tenderness   Lungs:     Clear to auscultation bilaterally, respirations unlabored   Chest Wall:    No tenderness or deformity Heart:    Regular rate and rhythm, S1 and S2 normal, no murmur, rub    or gallop       Abdomen:     Soft, non-tender, bowel sounds active all four quadrants,     no masses, no organomegaly           Extremities:   Extremities normal, atraumatic, no cyanosis or edema   Pulses:   2+ and symmetric all extremities   Skin:   Skin color, texture, turgor normal, no rashes or lesions   Lymph nodes:   Cervical, supraclavicular, and axillary nodes normal   Neurologic:   CNII-XII intact, normal strength, sensation and reflexes     throughout       Diagnostic Data:  CBC:   Results from last 7 days   Lab Units 01/13/22  2201   WBC Thousand/uL 9 23   HEMOGLOBIN g/dL 12 3   HEMATOCRIT % 37 5   PLATELETS Thousands/uL 344       CMP:   Results from last 7 days   Lab Units 01/13/22  2201   SODIUM mmol/L 137   POTASSIUM mmol/L 4 1   CHLORIDE mmol/L 103   CO2 mmol/L 25   BUN mg/dL 14   CREATININE mg/dL 0 89   CALCIUM mg/dL 8 5         Microbiology:  Results from last 7 days   Lab Units 01/13/22  2236   INFLUENZA A PCR  Negative       ABG: No results found for: PHART, NXM1VHR, PO2ART, LSQ9ZSG, L7VQLGDD, BEART, SOURCE    Imaging: Images and reports were personally reviewed       CT chest 1/6/2022    IMPRESSION:     No evidence pulmonary embolus      Scattered groundglass opacities likely infectious in etiology      Conglomerate mediastinal and bilateral hilar lymphadenopathy, neoplastic involvement not excluded given history of lung carcinoma  Correlation with outside studies recommended        Cardiac lab/EKG/telemetry/ECHO:   Results from last 7 days   Lab Units 01/14/22  0109   NT-PRO BNP pg/mL 205*       Franklyn Howe MD

## 2022-01-15 LAB
B PARAP IS1001 DNA NPH QL NAA+NON-PROBE: NOT DETECTED
B PERT.PT PRMT NPH QL NAA+NON-PROBE: NOT DETECTED
C PNEUM DNA NPH QL NAA+NON-PROBE: NOT DETECTED
FLUAV RNA NPH QL NAA+NON-PROBE: NOT DETECTED
FLUBV RNA NPH QL NAA+NON-PROBE: NOT DETECTED
HADV DNA NPH QL NAA+NON-PROBE: NOT DETECTED
HMPV RNA NPH QL NAA+NON-PROBE: NOT DETECTED
HPIV 1+2+3+4 RNA NPH QL NAA+NON-PROBE: NOT DETECTED
HPIV 1+2+3+4 RNA NPH QL NAA+NON-PROBE: NOT DETECTED
M PNEUMO DNA NPH QL NAA+NON-PROBE: NOT DETECTED
RSV RNA NPH QL NAA+NON-PROBE: NOT DETECTED
RV+EV RNA NPH QL NAA+NON-PROBE: NOT DETECTED

## 2022-01-17 NOTE — UTILIZATION REVIEW
Notification of Discharge   This is a Notification of Discharge from our facility [address]  Please be advised that this patient has been discharge from our facility  Below you will find the admission and discharge date and time including the patients disposition  UTILIZATION REVIEW CONTACT:  Elvia Milan  Utilization   Network Utilization Review Department  Phone: [phone] x carefully listen to the prompts  All voicemails are confidential   Email: [email or web address]     PHYSICIAN ADVISORY SERVICES:  FOR FLXI-[facility] REVIEW - MEDICAL NECESSITY DENIAL  Phone: [phone]  Fax: [phone]  Email: [email or web address]     PRESENTATION DATE: 1/13/2022  9:32 PM  OBERVATION ADMISSION DATE:  INPATIENT ADMISSION DATE: 1/13/22 11:55 PM   DISCHARGE DATE: 1/14/2022  4:20 PM  DISPOSITION: Home/Self Care Home/Self Care      IMPORTANT INFORMATION:  Send all requests for admission clinical reviews, approved or denied determinations and any other requests to dedicated fax number below belonging to the campus where the patient is receiving treatment   List of dedicated fax numbers:  [address] DENIALS (Administrative/Medical Necessity) [phone]   [address] (Maternity/NICU/Pediatrics) [phone]   [facility] [phone]   130 [facility] [phone]   [address] [place] [address] [phone]   2000 [facility] [address] [place] [address],[address] [phone]   [facility]  [phone]   [address] [facility]  [address] [phone]

## 2022-01-19 LAB
BACTERIA BLD CULT: NORMAL
BACTERIA BLD CULT: NORMAL

## 2022-02-02 LAB
DME PARACHUTE DELIVERY DATE ACTUAL: NORMAL
DME PARACHUTE DELIVERY DATE REQUESTED: NORMAL
DME PARACHUTE DELIVERY NOTE: NORMAL
DME PARACHUTE ITEM DESCRIPTION: NORMAL
DME PARACHUTE ORDER STATUS: NORMAL
DME PARACHUTE SUPPLIER NAME: NORMAL
DME PARACHUTE SUPPLIER PHONE: NORMAL

## 2022-02-08 ENCOUNTER — OFFICE VISIT (OUTPATIENT)
Dept: PULMONOLOGY | Facility: CLINIC | Age: 62
End: 2022-02-08
Payer: COMMERCIAL

## 2022-02-08 VITALS
WEIGHT: 142 LBS | HEART RATE: 89 BPM | BODY MASS INDEX: 26.13 KG/M2 | OXYGEN SATURATION: 99 % | DIASTOLIC BLOOD PRESSURE: 82 MMHG | HEIGHT: 62 IN | SYSTOLIC BLOOD PRESSURE: 110 MMHG

## 2022-02-08 DIAGNOSIS — J45.21 MILD INTERMITTENT ASTHMA WITH ACUTE EXACERBATION: ICD-10-CM

## 2022-02-08 DIAGNOSIS — J84.9 BILATERAL INTERSTITIAL PNEUMONIA (HCC): ICD-10-CM

## 2022-02-08 DIAGNOSIS — C34.32 MALIGNANT NEOPLASM OF LOWER LOBE OF LEFT LUNG (HCC): Primary | ICD-10-CM

## 2022-02-08 DIAGNOSIS — J18.9 BILATERAL PNEUMONIA: ICD-10-CM

## 2022-02-08 PROCEDURE — 94618 PULMONARY STRESS TESTING: CPT | Performed by: PHYSICIAN ASSISTANT

## 2022-02-08 PROCEDURE — 99204 OFFICE O/P NEW MOD 45 MIN: CPT | Performed by: PHYSICIAN ASSISTANT

## 2022-02-08 RX ORDER — IPRATROPIUM BROMIDE AND ALBUTEROL SULFATE 2.5; .5 MG/3ML; MG/3ML
3 SOLUTION RESPIRATORY (INHALATION) 4 TIMES DAILY
Qty: 360 ML | Refills: 1 | Status: SHIPPED | OUTPATIENT
Start: 2022-02-08 | End: 2022-04-09

## 2022-02-08 RX ORDER — BENZONATATE 100 MG/1
100 CAPSULE ORAL 3 TIMES DAILY PRN
Qty: 20 CAPSULE | Refills: 0 | Status: SHIPPED | OUTPATIENT
Start: 2022-02-08

## 2022-02-08 NOTE — ASSESSMENT & PLAN NOTE
1  I reviewed the images with her during our visit today  I also reviewed the plan  She has reported that she took prednisone however developed a whole-body rash did not tolerate the medication well  This was stopped  She is due for a repeat CT scan of her chest for follow-up of her lymphadenopathy and her pulmonary nodules noted on CT imaging performed in January  She will get this done at the beginning of March and she will follow up with us in the pulmonary office to review these results  2  She had a 6 minutes walk test today and she does not need oxygen  I have discontinued this  3  We did review this could possibly be cancer versus infection versus inflammation  She verbalized understanding all the differential diagnoses  A answered her and her friends questions to the best of my ability  They understand the plan

## 2022-02-08 NOTE — PROGRESS NOTES
Pulmonary Follow Up Note   [facility] 64 y o  female MRN: [id number]  2/8/2022      Assessment:    Bilateral interstitial pneumonia ([address] )  1  I reviewed the images with her during our visit today  I also reviewed the plan  She has reported that she took prednisone however developed a whole-body rash did not tolerate the medication well  This was stopped  She is due for a repeat CT scan of her chest for follow-up of her lymphadenopathy and her pulmonary nodules noted on CT imaging performed in January  She will get this done at the beginning of March and she will follow up with us in the pulmonary office to review these results  2  She had a 6 minutes walk test today and she does not need oxygen  I have discontinued this  3  We did review this could possibly be cancer versus infection versus inflammation  She verbalized understanding all the differential diagnoses  A answered her and her friends questions to the best of my ability  They understand the plan  Plan:    Problem List Items Addressed This Visit        Respiratory    Mild intermittent asthma with acute exacerbation    Relevant Medications    benzonatate (TESSALON PERLES) 100 mg capsule    ipratropium-albuterol (DUO-NEB) 0 5-2 5 mg/3 mL nebulizer solution    Other Relevant Orders    POCT 6 minute walk    Discontinue home oxygen    Malignant neoplasm of lower lobe of left lung (HCC) - Primary    Relevant Medications    benzonatate (TESSALON PERLES) 100 mg capsule    ipratropium-albuterol (DUO-NEB) 0 5-2 5 mg/3 mL nebulizer solution    Bilateral interstitial pneumonia ([address] )     4  I reviewed the images with her during our visit today  I also reviewed the plan  She has reported that she took prednisone however developed a whole-body rash did not tolerate the medication well  This was stopped    She is due for a repeat CT scan of her chest for follow-up of her lymphadenopathy and her pulmonary nodules noted on CT imaging performed in January  She will get this done at the beginning of March and she will follow up with us in the pulmonary office to review these results  5  She had a 6 minutes walk test today and she does not need oxygen  I have discontinued this  6  We did review this could possibly be cancer versus infection versus inflammation  She verbalized understanding all the differential diagnoses  A answered her and her friends questions to the best of my ability  They understand the plan  Relevant Medications    benzonatate (TESSALON PERLES) 100 mg capsule    ipratropium-albuterol (DUO-NEB) 0 5-2 5 mg/3 mL nebulizer solution    Other Relevant Orders    Discontinue home oxygen      Other Visit Diagnoses     Bilateral pneumonia        Relevant Medications    benzonatate (TESSALON PERLES) 100 mg capsule    ipratropium-albuterol (DUO-NEB) 0 5-2 5 mg/3 mL nebulizer solution    Other Relevant Orders    POCT 6 minute walk    CT chest wo contrast      Patient perform 6 minutes walk test   She was able to ambulate for 6 minutes and maintain her oxygen saturation greater than 94%  No follow-ups on file  History of Present Illness   HPI:  Bill Owusu is a 64 y o  female who presents to the office today for a hospital follow-up  She was last seen in the ED by my attending physician after she was diagnosed with COVID-19 and had an abnormal CT scan of the chest   She was provided with 1 time dose of ceftriaxone in the ED and it was felt this was likely secondary to inflammation and she was sent home on a prednisone taper  Unfortunately patient did not tolerate prednisone well and had a stopper course early  She then reports that she is still having some slight cough and some shortness of breath  She is having some difficulty understanding what is possibly going on her lungs  I took time today to explain and review CT findings with her and her friend during this visit today  I answered all their questions    They verbalized that they understand the plan  I would like her to get a repeat CT scan at the beginning of March to follow up the pulmonary nodule and mediastinal lymphadenopathy  I would then like her to follow up in our Pulmonary office to review these test results and further treatment  She is aware that if this is still present on CT imaging done a bronchoscopy and biopsy would be the next step however if this continues to improve and this is likely inflammation and no further workup is needed at this time  She is taking her albuterol inhaler as well as her maintenance inhaler and tolerated it well  We did a 6 minutes walk test today and she does not need oxygen any further  She does have a history of cancer and is aware this could possibly be recurrence of her cancer however at this time this is low on my differential diagnosis  Review of Systems   Respiratory: Positive for cough  All other systems reviewed and are negative        Historical Information   Past Medical History:   Diagnosis Date    Lung cancer ([address] )     NICKO (obstructive sleep apnea)     Ovarian cyst     Psychiatric disorder     Anxiety, depression     Past Surgical History:   Procedure Laterality Date    CARPAL TUNNEL RELEASE Right     CERVICAL CONE BIOPSY      CHOLECYSTECTOMY      COLONOSCOPY      EGD      ENDOMETRIAL ABLATION      LEFT OOPHORECTOMY  2007    LUNG LOBECTOMY Left 2018    NASAL SEPTUM SURGERY      TUBAL LIGATION       Family History   Problem Relation Age of Onset    Depression Mother     Alcohol abuse Father     Skin cancer Father    Yoanna Mare Migraines Sister     Hypertension Brother          Meds/Allergies     Current Outpatient Medications:     albuterol (PROVENTIL HFA,VENTOLIN HFA) 90 mcg/act inhaler, Inhale 2 puffs every 4 (four) hours as needed for wheezing or shortness of breath, Disp: 18 g, Rfl: 0    Ascorbic Acid (VITAMIN C PO), Take by mouth, Disp: , Rfl:     ipratropium-albuterol (DUO-NEB) 0 5-2 5 mg/3 mL nebulizer solution, Take 3 mL by nebulization 4 (four) times a day, Disp: 360 mL, Rfl: 1    Multiple Vitamins-Minerals (VITAMIN D3 COMPLETE PO), Take by mouth, Disp: , Rfl:     pantoprazole (PROTONIX) 40 mg tablet, Take 1 tablet (40 mg total) by mouth daily in the early morning, Disp: 30 tablet, Rfl: 0    Zinc Sulfate (ZINC 15 PO), Take by mouth, Disp: , Rfl:     acetaminophen (TYLENOL) 325 mg tablet, Take 2 tablets (650 mg total) by mouth every 6 (six) hours as needed for mild pain, headaches or fever (Patient not taking: Reported on 2/8/2022 ), Disp: , Rfl: 0    benzonatate (TESSALON PERLES) 100 mg capsule, Take 1 capsule (100 mg total) by mouth 3 (three) times a day as needed for cough (Patient not taking: Reported on 2/8/2022 ), Disp: 20 capsule, Rfl: 0    benzonatate (TESSALON PERLES) 100 mg capsule, Take 1 capsule (100 mg total) by mouth 3 (three) times a day as needed for cough, Disp: 20 capsule, Rfl: 0  Allergies   Allergen Reactions    Amoxicillin Hives, Rash and Shortness Of Breath    Azithromycin Shortness Of Breath    Cucurbita Anaphylaxis    Guaifenesin Shortness Of Breath     hives and sob      Ketorolac Shortness Of Breath    Ketorolac Tromethamine Shortness Of Breath    Levofloxacin Hives and Shortness Of Breath    Nitrofurantoin Hives, Rash and Shortness Of Breath    Peanut [Nuts - Food Allergy] Shortness Of Breath    Sertraline Other (See Comments) and Shortness Of Breath    Shellfish-Derived Products - Food Allergy Shortness Of Breath    Sulfamethoxazole-Trimethoprim Rash and Shortness Of Breath    Wheat Dextrin - Food Allergy Shortness Of Breath and GI Intolerance    Medical Tape Rash    Other Rash    Prednisone Rash       Vitals: Blood pressure 110/82, pulse 89, height 5' 2" (1 575 m), weight 64 4 kg (142 lb), SpO2 99 %  Body mass index is 25 97 kg/m²  Physical Exam  Physical Exam  Vitals and nursing note reviewed  Constitutional:       Appearance: Normal appearance  She is not toxic-appearing or diaphoretic  HENT:      Head: Normocephalic and atraumatic  Right Ear: External ear normal  There is no impacted cerumen  Left Ear: External ear normal  There is no impacted cerumen  Nose: Nose normal  No congestion  Mouth/Throat:      Mouth: Mucous membranes are moist       Pharynx: Oropharynx is clear  No oropharyngeal exudate  Eyes:      General: No scleral icterus  Extraocular Movements: Extraocular movements intact  Pupils: Pupils are equal, round, and reactive to light  Cardiovascular:      Rate and Rhythm: Normal rate and regular rhythm  Pulses: Normal pulses  Heart sounds: Normal heart sounds  No murmur heard  No friction rub  No gallop  Pulmonary:      Effort: Pulmonary effort is normal  No respiratory distress  Breath sounds: Normal breath sounds  No stridor  No wheezing, rhonchi or rales  Chest:      Chest wall: No tenderness  Abdominal:      General: Abdomen is flat  Bowel sounds are normal  There is no distension  Palpations: Abdomen is soft  Tenderness: There is no abdominal tenderness  Musculoskeletal:         General: No swelling or tenderness  Normal range of motion  Cervical back: Normal range of motion and neck supple  Skin:     General: Skin is warm and dry  Capillary Refill: Capillary refill takes less than 2 seconds  Neurological:      General: No focal deficit present  Mental Status: She is alert and oriented to person, place, and time  Psychiatric:         Mood and Affect: Mood normal          Labs: I have personally reviewed pertinent lab results  , ABG: No results found for: PHART, SJP3SDR, PO2ART, UBJ2JYD, E3XEMLOY, BEART, SOURCE, BNP: No results found for: BNP, CBC: No results found for: WBC, HGB, HCT, MCV, PLT, ADJUSTEDWBC, MCH, MCHC, RDW, MPV, NRBC, CMP: No results found for: SODIUM, K, CL, CO2, ANIONGAP, BUN, CREATININE, GLUCOSE, CALCIUM, AST, ALT, ALKPHOS, PROT, BILITOT, EGFR, PT/INR: No results found for: PT, INR, Troponin: No results found for: TROPONINI  Lab Results   Component Value Date    WBC 9 23 01/13/2022    HGB 12 3 01/13/2022    HCT 37 5 01/13/2022    MCV 93 01/13/2022     01/13/2022     Lab Results   Component Value Date    CALCIUM 8 5 01/13/2022    K 4 1 01/13/2022    CO2 25 01/13/2022     01/13/2022    BUN 14 01/13/2022    CREATININE 0 89 01/13/2022     No results found for: IGE  Lab Results   Component Value Date    ALT 25 01/06/2022    AST 36 01/06/2022    ALKPHOS 83 01/06/2022     Imaging and other studies: I have personally reviewed pertinent reports  1/6/22:     FINDINGS:     PULMONARY ARTERIAL TREE:  No pulmonary embolus is seen       LUNGS:  Scattered bilateral groundglass opacities are present  Patient is status post left lower lobectomy  There is no tracheal or endobronchial lesion      PLEURA:  Unremarkable      HEART/GREAT VESSELS:  Unremarkable for patient's age      MEDIASTINUM AND ALESSANDRO:  Bilateral hilar lymphadenopathy is identified  Conglomerate mediastinal lymphadenopathy is also present      CHEST WALL AND LOWER NECK:   Unremarkable      VISUALIZED STRUCTURES IN THE UPPER ABDOMEN:  4 7 cm hepatic cyst is noted  Subcentimeter hepatic hypodensities are too small to characterize      OSSEOUS STRUCTURES:  No acute fracture or destructive osseous lesion      IMPRESSION:     No evidence pulmonary embolus      Scattered groundglass opacities likely infectious in etiology      Conglomerate mediastinal and bilateral hilar lymphadenopathy, neoplastic involvement not excluded given history of lung carcinoma  Correlation with outside studies recommended  FINDINGS:     PULMONARY ARTERIAL TREE:  No pulmonary embolus is seen       LUNGS:  Status post left lower lobectomy  Increased patchy groundglass opacities bilaterally  Interlobular septal thickening is present    There is no tracheal or endobronchial lesion      PLEURA:  Trace left pleural effusion, similar to prior      HEART/GREAT VESSELS:  Unremarkable for patient's age      MEDIASTINUM AND ALESSANDRO:  Stable bilateral hilar and mediastinal lymphadenopathy  For example:  -1 5 x 1 8 cm left hilar lymph node (6/91)  -1 2 x 1 4 cm right hilar lymph node (6/110)   -1 1 x 2 1 cm subcarinal lymph node (6/88)      CHEST WALL AND LOWER NECK:   Unremarkable      VISUALIZED STRUCTURES IN THE UPPER ABDOMEN:  Stable 4 7 cm lobulated cyst in the hepatic dome  Additional scattered subcentimeter hepatic hypodensities are stable      OSSEOUS STRUCTURES:  No acute fracture or destructive osseous lesion      IMPRESSION:      1  No evidence of pulmonary embolus  2   Increased patchy groundglass opacities bilaterally with interlobular septal thickening suspicious for multifocal infection including viral pneumonia  Differential includes pulmonary edema  3   Trace left pleural effusion  4   Stable bilateral hilar and mediastinal lymphadenopathy  Follow-up is recommended  Pulmonary function testing: None     EKG, Pathology, and Other Studies: I have personally reviewed pertinent reports          Wayen Guerin PA-C

## 2022-02-09 ENCOUNTER — TELEPHONE (OUTPATIENT)
Dept: PULMONOLOGY | Facility: CLINIC | Age: 62
End: 2022-02-09

## 2022-02-09 NOTE — TELEPHONE ENCOUNTER
Pt called, she was in [facility] to see Abe Brandt yesterday and was told there are no follow up appts with Dr Ole Newsome until 5/22 and she needed to see him sooner  She was told someone would work on finding her an appt sooner with Dr Ole Newsome  She was following up on this    Please advise: [phone]

## 2022-02-24 ENCOUNTER — HOSPITAL ENCOUNTER (OUTPATIENT)
Dept: CT IMAGING | Facility: HOSPITAL | Age: 62
Discharge: HOME | End: 2022-02-24
Payer: COMMERCIAL

## 2022-02-24 DIAGNOSIS — J18.9 BILATERAL PNEUMONIA: ICD-10-CM

## 2022-02-24 PROCEDURE — G1004 CDSM NDSC: HCPCS

## 2022-02-24 PROCEDURE — 71250 CT THORAX DX C-: CPT

## 2022-03-01 NOTE — PROGRESS NOTES
Pulmonary Follow Up Note   [facility] 64 y o  female MRN: [id number]  3/2/2022      Assessment:    Cough  Based upon her description, I suspect upper airway cough syndrome/postnasal drip  However potentially has underlying asthma as well  Plan   Recommend daily use of sinonasal rinses   Start Flonase  Recommend over-the-counter antihistamine given report of allergies   Have referred for full pulmonary function tests    Mild intermittent asthma with acute exacerbation  Has a chart diagnosis of asthma but never underwent formal pulmonary function test   Does not have any significant wheezing or shortness of breath now that previous episode of pneumonia/pneumonitis has resolved  Still has a persistent cough  Plan   Continue albuterol as needed  Start Singulair   Referred for pulmonary function test  I suspect ICS therapy is possible but would need to be closely monitor given previous adverse reaction to Solu-Medrol  Bilateral interstitial pneumonia ([facility] [address] )  I reviewed the CT images from February 24th with the patient and her sister today  There has been resolution of the diffuse ground-glass opacities  There is possible subtle bronchiolitis but not of any significant degree  At time does not require further radiographic evaluation for the pneumonitis  However will need did clinically monitor when she returns to work at the [employer]  Also undergoing evaluation for asthma  Additionally she will have repeat CT of the chest in October as part of her lung cancer screening  She is establishing with a new oncologist as well  Will defer cancer surveillance to her oncologist      Plan:    Diagnoses and all orders for this visit:    Mild intermittent asthma with acute exacerbation    History of lung cancer    Post-nasal drip  -     fluticasone (FLONASE) 50 mcg/act nasal spray; 1 spray into each nostril daily  -     montelukast (SINGULAIR) 10 mg tablet;  Take 1 tablet (10 mg total) by mouth daily at bedtime    Cough  -     Complete PFT with post bronchodilator; Future  -     montelukast (SINGULAIR) 10 mg tablet; Take 1 tablet (10 mg total) by mouth daily at bedtime    Bilateral interstitial pneumonia ([address] )    Other orders  -     cholecalciferol (VITAMIN D3) 1,000 units tablet; Take 1,000 Units by mouth daily  -     cetirizine (ZyrTEC) 10 MG chewable tablet; Chew 10 mg daily        No follow-ups on file  History of Present Illness   HPI:  Nico Cota is a 64 y o  female history of non-small cell lung cancer (adenocarcinoma) involving left lower lobe (diagnosed in June 2018) and treated with left lower lobe lobectomy (no adjuvant chemotherapy)  Patient also has history of COVID-19 pneumonia diagnosed November 2021, obstructive sleep apnea, anxiety/depression  She was admitted to [address] [place] [address] between 1/13/2022 and 1/14/2022 when she presented to the emergency room due to right-sided chest pain and shortness of breath  CT angiogram showed no evidence of pulmonary embolism but bilateral ground-glass opacities as well as interlobular septal thickening and a small left trace pleural effusion with stable lymphadenopathy  COVID-19/influenza/RSV PCR were negative  She was admitted and has been prescribed ceftriaxone and solumedrol 40 mg q8 hours    She was referred seen for follow-up by MARY Dolan on 2/8  At that time her CT images reviewed  6 minute walk study was performed and no desaturation occurred  Follow-up CT of the chest was obtained on February 24th and showed interval resolution of the ground-glass opacities with small centrilobular ground-glass nodules compatible with respiratory bronchiolitis  There is no significant mediastinal adenopathy  She is overall feeling pretty good  No longer experiencing any shortness of breath  However does have a persistent cough  Also has some mid epigastric abdominal pain  This is potentially related to coughing  Has postnasal drip    She does have have a Neti pot but does not use it regularly  She has been trying to increase her exertional activity, but she develops a "gripping" in her abdominal area and back and dyspnea  States she was given empiric diagnosis of asthma but never underwent PFTs  Here with her sister today  She does not have any acute complaints  Review of Systems   Constitutional: Negative for chills, fatigue and fever  HENT: Negative for congestion, nosebleeds, postnasal drip, rhinorrhea, sinus pressure and sore throat  Eyes: Negative for discharge, redness and itching  Respiratory: Positive for cough  Negative for choking, chest tightness, shortness of breath, wheezing and stridor  Cardiovascular: Negative for chest pain, palpitations and leg swelling  Gastrointestinal: Negative for blood in stool  Genitourinary: Negative for difficulty urinating and dysuria  Musculoskeletal: Negative for arthralgias, joint swelling and myalgias  Skin: Negative for color change and rash  Neurological: Negative for light-headedness and headaches  Hematological: Negative for adenopathy         Historical Information   Past Medical History:   Diagnosis Date    [address] [address]     NICKO (obstructive sleep apnea)     Ovarian cyst     Pneumonia     Psychiatric disorder     Anxiety, depression     Past Surgical History:   Procedure Laterality Date    CARPAL TUNNEL RELEASE Right     CERVICAL CONE BIOPSY      CHOLECYSTECTOMY      COLONOSCOPY      EGD      ENDOMETRIAL ABLATION      LEFT OOPHORECTOMY  2007    LUNG LOBECTOMY Left 2018    NASAL SEPTUM SURGERY      TUBAL LIGATION       Family History   Problem Relation Age of Onset    Depression Mother     Alcohol abuse Father     Skin cancer Father    Barbra Maurer Migraines Sister     Hypertension Brother          Meds/Allergies     Current Outpatient Medications:     albuterol (PROVENTIL HFA,VENTOLIN HFA) 90 mcg/act inhaler, Inhale 2 puffs every 4 (four) hours as needed for wheezing or shortness of breath, Disp: 18 g, Rfl: 0    Ascorbic Acid (VITAMIN C PO), Take by mouth, Disp: , Rfl:     benzonatate (TESSALON PERLES) 100 mg capsule, Take 1 capsule (100 mg total) by mouth 3 (three) times a day as needed for cough, Disp: 20 capsule, Rfl: 0    cetirizine (ZyrTEC) 10 MG chewable tablet, Chew 10 mg daily, Disp: , Rfl:     cholecalciferol (VITAMIN D3) 1,000 units tablet, Take 1,000 Units by mouth daily, Disp: , Rfl:     ipratropium-albuterol (DUO-NEB) 0 5-2 5 mg/3 mL nebulizer solution, Take 3 mL by nebulization 4 (four) times a day, Disp: 360 mL, Rfl: 1    Multiple Vitamins-Minerals (VITAMIN D3 COMPLETE PO), Take by mouth, Disp: , Rfl:     pantoprazole (PROTONIX) 40 mg tablet, Take 1 tablet (40 mg total) by mouth daily in the early morning, Disp: 30 tablet, Rfl: 0    Zinc Sulfate (ZINC 15 PO), Take by mouth, Disp: , Rfl:     benzonatate (TESSALON PERLES) 100 mg capsule, Take 1 capsule (100 mg total) by mouth 3 (three) times a day as needed for cough, Disp: 20 capsule, Rfl: 0    fluticasone (FLONASE) 50 mcg/act nasal spray, 1 spray into each nostril daily, Disp: 16 g, Rfl: 3    montelukast (SINGULAIR) 10 mg tablet, Take 1 tablet (10 mg total) by mouth daily at bedtime, Disp: 90 tablet, Rfl: 3  Allergies   Allergen Reactions    Amoxicillin Hives, Rash and Shortness Of Breath    Azithromycin Shortness Of Breath    Cucurbita Anaphylaxis    Guaifenesin Shortness Of Breath     hives and sob      Ketorolac Shortness Of Breath    Ketorolac Tromethamine Shortness Of Breath    Levofloxacin Hives and Shortness Of Breath    Nitrofurantoin Hives, Rash and Shortness Of Breath    Peanut [Nuts - Food Allergy] Shortness Of Breath    Sertraline Other (See Comments) and Shortness Of Breath    Shellfish-Derived Products - Food Allergy Shortness Of Breath    Sulfamethoxazole-Trimethoprim Rash and Shortness Of Breath    Wheat Dextrin - Food Allergy Shortness Of Breath and GI Intolerance    Medical Tape Rash    Other Rash    Prednisone Rash       Vitals: Blood pressure 110/74, pulse 87, temperature 97 7 °F (36 5 °C), temperature source Tympanic, height 5' 2" (1 575 m), weight 66 3 kg (146 lb 3 2 oz), SpO2 96 %  Body mass index is 26 74 kg/m²  Oxygen Therapy  SpO2: 96 %  Oxygen Therapy: None (Room air)      Physical Exam  Physical Exam  Vitals reviewed  Constitutional:       General: She is not in acute distress  Appearance: She is well-developed  She is not ill-appearing, toxic-appearing or diaphoretic  HENT:      Head: Normocephalic and atraumatic  Right Ear: External ear normal       Left Ear: External ear normal       Nose: Nose normal  No congestion or rhinorrhea  Mouth/Throat:      Pharynx: No oropharyngeal exudate  Eyes:      General: No scleral icterus  Right eye: No discharge  Left eye: No discharge  Conjunctiva/sclera: Conjunctivae normal       Pupils: Pupils are equal, round, and reactive to light  Neck:      Trachea: No tracheal deviation  Cardiovascular:      Rate and Rhythm: Normal rate and regular rhythm  Heart sounds: Normal heart sounds  No murmur heard  No friction rub  No gallop  Pulmonary:      Effort: Pulmonary effort is normal       Breath sounds: Normal breath sounds  No stridor  No wheezing or rales  Musculoskeletal:      Cervical back: Normal range of motion  Right lower leg: No edema  Left lower leg: No edema  Lymphadenopathy:      Head:      Right side of head: No submental, submandibular, preauricular or posterior auricular adenopathy  Left side of head: No submental, submandibular, preauricular or posterior auricular adenopathy  Cervical: No cervical adenopathy  Skin:     General: Skin is warm and dry  Findings: No lesion or rash  Neurological:      Mental Status: She is alert and oriented to person, place, and time  Labs:  I have personally reviewed pertinent lab results  Lab Results   Component Value Date    WBC 9 23 2022    HGB 12 3 2022    HCT 37 5 2022    MCV 93 2022     2022     Lab Results   Component Value Date    CALCIUM 8 5 2022    K 4 1 2022    CO2 25 2022     2022    BUN 14 2022    CREATININE 0 89 2022     No results found for: IGE  Lab Results   Component Value Date    ALT 25 2022    AST 36 2022    ALKPHOS 83 2022       Imaging and other studies: I have personally reviewed pertinent reports  and I have personally reviewed pertinent films in PACS    CT chest 2022  IMPRESSION:     1  Interval resolution of previously seen patchy groundglass pulmonary opacities, compatible with resolution of pneumonia  2   Diffuse tiny centrilobular groundglass nodules compatible with respiratory bronchiolitis      Pulmonary function testinMWT 2022     Patient started with an SpO2 of 97% on room air and heart rate of 83   She was ambulating well for 6 minutes   Ending SpO2 of 94% with a heart rate of 112  GAURANG Subramanianodis [facility]

## 2022-03-02 ENCOUNTER — OFFICE VISIT (OUTPATIENT)
Dept: PULMONOLOGY | Facility: CLINIC | Age: 62
End: 2022-03-02
Payer: COMMERCIAL

## 2022-03-02 VITALS
HEART RATE: 87 BPM | WEIGHT: 146.2 LBS | HEIGHT: 62 IN | TEMPERATURE: 97.7 F | DIASTOLIC BLOOD PRESSURE: 74 MMHG | SYSTOLIC BLOOD PRESSURE: 110 MMHG | BODY MASS INDEX: 26.91 KG/M2 | OXYGEN SATURATION: 96 %

## 2022-03-02 DIAGNOSIS — R09.82 POST-NASAL DRIP: ICD-10-CM

## 2022-03-02 DIAGNOSIS — Z85.118 HISTORY OF LUNG CANCER: ICD-10-CM

## 2022-03-02 DIAGNOSIS — R05.9 COUGH: ICD-10-CM

## 2022-03-02 DIAGNOSIS — J84.9 BILATERAL INTERSTITIAL PNEUMONIA (HCC): ICD-10-CM

## 2022-03-02 DIAGNOSIS — J45.21 MILD INTERMITTENT ASTHMA WITH ACUTE EXACERBATION: Primary | ICD-10-CM

## 2022-03-02 PROCEDURE — 99214 OFFICE O/P EST MOD 30 MIN: CPT | Performed by: INTERNAL MEDICINE

## 2022-03-02 RX ORDER — FLUTICASONE PROPIONATE 50 MCG
1 SPRAY, SUSPENSION (ML) NASAL DAILY
Qty: 16 G | Refills: 3 | Status: SHIPPED | OUTPATIENT
Start: 2022-03-02

## 2022-03-02 RX ORDER — CETIRIZINE HYDROCHLORIDE 10 MG/1
10 TABLET, CHEWABLE ORAL DAILY
COMMUNITY

## 2022-03-02 RX ORDER — MELATONIN
1000 DAILY
COMMUNITY

## 2022-03-02 RX ORDER — MONTELUKAST SODIUM 10 MG/1
10 TABLET ORAL
Qty: 90 TABLET | Refills: 3 | Status: SHIPPED | OUTPATIENT
Start: 2022-03-02

## 2022-03-02 NOTE — LETTER
March 2, 2022     Patient: Jose Camilo   YOB: 1960   Date of Visit: 3/2/2022       To Whom it May Concern:    Jose Camilo is under my professional care  She was seen in my office on 3/2/2022  She may return to work on 3/7/2022 without any restrictions  If you have any questions or concerns, please don't hesitate to call           Sincerely,          Clarisse Santiago MD        CC: No Recipients

## 2022-03-02 NOTE — ASSESSMENT & PLAN NOTE
Based upon her description, I suspect upper airway cough syndrome/postnasal drip  However potentially has underlying asthma as well        Plan   Recommend daily use of sinonasal rinses   Start Flonase  Recommend over-the-counter antihistamine given report of allergies   Have referred for full pulmonary function tests

## 2022-03-02 NOTE — ASSESSMENT & PLAN NOTE
Has a chart diagnosis of asthma but never underwent formal pulmonary function test   Does not have any significant wheezing or shortness of breath now that previous episode of pneumonia/pneumonitis has resolved  Still has a persistent cough  Plan   Continue albuterol as needed  Start Singulair   Referred for pulmonary function test  I suspect ICS therapy is possible but would need to be closely monitor given previous adverse reaction to Solu-Medrol

## 2022-03-02 NOTE — ASSESSMENT & PLAN NOTE
I reviewed the CT images from February 24th with the patient and her sister today  There has been resolution of the diffuse ground-glass opacities  There is possible subtle bronchiolitis but not of any significant degree  At time does not require further radiographic evaluation for the pneumonitis  However will need did clinically monitor when she returns to work at the [employer]  Also undergoing evaluation for asthma  Additionally she will have repeat CT of the chest in October as part of her lung cancer screening  She is establishing with a new oncologist as well    Will defer cancer surveillance to her oncologist

## 2022-03-09 ENCOUNTER — TELEPHONE (OUTPATIENT)
Dept: PULMONOLOGY | Facility: CLINIC | Age: 62
End: 2022-03-09

## 2022-03-09 NOTE — TELEPHONE ENCOUNTER
Patient calling stating she had gone back to work Monday and she stated the air quality at work is definitely affecting her  She stated she has been coughing again   Please advise [phone]
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
## 2022-03-15 ENCOUNTER — HOSPITAL ENCOUNTER (OUTPATIENT)
Dept: PULMONOLOGY | Facility: HOSPITAL | Age: 62
Discharge: HOME | End: 2022-03-15
Attending: INTERNAL MEDICINE
Payer: COMMERCIAL

## 2022-03-15 ENCOUNTER — TELEPHONE (OUTPATIENT)
Dept: PULMONOLOGY | Facility: CLINIC | Age: 62
End: 2022-03-15

## 2022-03-15 DIAGNOSIS — R05.9 COUGH: ICD-10-CM

## 2022-03-15 PROCEDURE — 94729 DIFFUSING CAPACITY: CPT

## 2022-03-15 PROCEDURE — 94060 EVALUATION OF WHEEZING: CPT

## 2022-03-15 PROCEDURE — 94727 GAS DIL/WSHOT DETER LNG VOL: CPT

## 2022-03-15 PROCEDURE — 94060 EVALUATION OF WHEEZING: CPT | Performed by: INTERNAL MEDICINE

## 2022-03-15 PROCEDURE — 94726 PLETHYSMOGRAPHY LUNG VOLUMES: CPT | Performed by: INTERNAL MEDICINE

## 2022-03-15 PROCEDURE — 94760 N-INVAS EAR/PLS OXIMETRY 1: CPT

## 2022-03-15 PROCEDURE — 94729 DIFFUSING CAPACITY: CPT | Performed by: INTERNAL MEDICINE

## 2022-03-15 RX ORDER — ALBUTEROL SULFATE 2.5 MG/3ML
2.5 SOLUTION RESPIRATORY (INHALATION) ONCE AS NEEDED
Status: COMPLETED | OUTPATIENT
Start: 2022-03-15 | End: 2022-03-15

## 2022-03-15 RX ADMIN — ALBUTEROL SULFATE 2.5 MG: 2.5 SOLUTION RESPIRATORY (INHALATION) at 12:53

## 2022-03-15 NOTE — LETTER
March 15, 2022     Patient: Sam eFrnandes   YOB: 1960   Date of Visit: 3/15/2022       To Whom it May Concern:    Sam Fernandes is under my professional care  She was seen in my office on 3/15/2022  She is currently being worked up for pneumonitis with symptoms including persistent cough that may be related to occupational exposures  While she is undergoing work-up would recommend she change departments as to avoid potential triggers  If you have any questions or concerns, please don't hesitate to call           Sincerely,          Masood Miller PA-C        CC: No Recipients

## 2022-04-18 ENCOUNTER — TELEMEDICINE (OUTPATIENT)
Dept: PULMONOLOGY | Facility: CLINIC | Age: 62
End: 2022-04-18
Payer: COMMERCIAL

## 2022-04-18 DIAGNOSIS — R05.9 COUGH: ICD-10-CM

## 2022-04-18 DIAGNOSIS — Z85.118 HISTORY OF LUNG CANCER: ICD-10-CM

## 2022-04-18 DIAGNOSIS — J45.21 MILD INTERMITTENT ASTHMA WITH ACUTE EXACERBATION: Primary | ICD-10-CM

## 2022-04-18 PROCEDURE — 99214 OFFICE O/P EST MOD 30 MIN: CPT | Performed by: INTERNAL MEDICINE

## 2022-04-18 NOTE — PROGRESS NOTES
Virtual Regular Visit    Verification of patient location:    Patient is located in the following state in which I hold an active license [place]      Assessment/Plan:    Problem List Items Addressed This Visit        Respiratory    Mild intermittent asthma with acute exacerbation - Primary     Work related asthma versus environmentally driven asthma is suspected  Patient has now relocated to a different area in her job  Overall symptoms fairly well controlled though she is experiencing some chest tightness with exertion  Symptoms typically relieved with use of albuterol  We discussed use of Singulair today  I recommend that she try the medication  If she experiences any adverse effects she will stop the medication and let me know  Could consider definitive testing for asthma but will defer at this time and treat symptomatically    Plan     Continue albuterol as needed  Start Singulair  Could consider methacholine challenge study in the future  Continue environmental allergy treatment  She has relocated to a different department in her job  Repeat CT chest due in October for lung cancer surveillance  Other    History of lung cancer    Cough     Secondary to postnasal drip possibly underlying environmental allergies and asthma  Much better controlled  Plan  Continue sinonasal rinses, Flonase, over-the-counter antihistamines  Recommend she start Singulair  If she does experience any adverse effects then stop medication and let me know  Reason for visit is   Chief Complaint   Patient presents with    Virtual Regular Visit        Encounter provider Libby Pavon MD    Provider located at [address] [place] [id number] [phone]      Recent Visits  No visits were found meeting these conditions    Showing recent visits within past 7 days and meeting all other requirements  Today's Visits  Date Type Provider Dept   04/18/22 Telemedicine Santiago King MD [facility]   Showing today's visits and meeting all other requirements  Future Appointments  No visits were found meeting these conditions  Showing future appointments within next 150 days and meeting all other requirements       The patient was identified by name and date of birth  Cristo Zaman was informed that this is a telemedicine visit and that the visit is being conducted through [address] [place] [address] [facility] and patient was informed that this is a secure, HIPAA-compliant platform  She agrees to proceed     My office door was closed  No one else was in the room  She acknowledged consent and understanding of privacy and security of the video platform  The patient has agreed to participate and understands they can discontinue the visit at any time  Patient is aware this is a billable service  Subjective  Cristo Zaman is a 64 y o  female history of non-small cell lung cancer (adenocarcinoma) involving the left lower lobe diagnosed in June 2018 and treated with left lower lobe lobectomy (did not require adjuvant chemotherapy)  Patient also has history of COVID-19 pneumonia diagnosed in 2021, obstructive sleep apnea, anxiety/depression  She has been is the [facility] [employer] and January due to right-sided chest pain shortness of breath  CT angiogram showed no evidence of pulmonary embolism but bilateral ground-glass opacities  presents for virtual appointment  She was last seen in March  At that time we discussed her chronic cough which is felt to be secondary to upper airway cough syndrome  She was prescribed Flonase and sinonasal rinses recommended  She was referred for pulmonary function test   Previous CT the chest from February was reviewed that showed resolution of diffuse ground-glass opacities  Repeat CT of the chest is due in October for lung cancer screening      Pulmonary function test were completed in March and essentially normal with only a very mild reduction in DLCO  States that she is doing ok  Did have some environmental allergies  She is using her albuterol inhaler  She is looking for a new gastroenterologist and oncologist   States she has had a reaction to solumedrol in the past --hives and some throat tightness  She is experiencing some chest tightness with exertion  Did not start singulair due to concern for sideeffects  PND is much better controlled with flonase/sinonasla rinses  She is working in a different department at her job         HPI     Past Medical History:   Diagnosis Date    Lung cancer ([address] )     NICKO (obstructive sleep apnea)     Ovarian cyst     Pneumonia     Psychiatric disorder     Anxiety, depression       Past Surgical History:   Procedure Laterality Date    CARPAL TUNNEL RELEASE Right     CERVICAL CONE BIOPSY      CHOLECYSTECTOMY      COLONOSCOPY      EGD      ENDOMETRIAL ABLATION      LEFT OOPHORECTOMY  2007    LUNG LOBECTOMY Left 2018    NASAL SEPTUM SURGERY      TUBAL LIGATION         Current Outpatient Medications   Medication Sig Dispense Refill    albuterol (PROVENTIL HFA,VENTOLIN HFA) 90 mcg/act inhaler Inhale 2 puffs every 4 (four) hours as needed for wheezing or shortness of breath 18 g 0    Ascorbic Acid (VITAMIN C PO) Take by mouth      benzonatate (TESSALON PERLES) 100 mg capsule Take 1 capsule (100 mg total) by mouth 3 (three) times a day as needed for cough 20 capsule 0    benzonatate (TESSALON PERLES) 100 mg capsule Take 1 capsule (100 mg total) by mouth 3 (three) times a day as needed for cough 20 capsule 0    cetirizine (ZyrTEC) 10 MG chewable tablet Chew 10 mg daily      cholecalciferol (VITAMIN D3) 1,000 units tablet Take 1,000 Units by mouth daily      fluticasone (FLONASE) 50 mcg/act nasal spray 1 spray into each nostril daily 16 g 3    Multiple Vitamins-Minerals (VITAMIN D3 COMPLETE PO) Take by mouth      Zinc Sulfate (ZINC 15 PO) Take by mouth  montelukast (SINGULAIR) 10 mg tablet Take 1 tablet (10 mg total) by mouth daily at bedtime (Patient not taking: Reported on 4/18/2022 ) 90 tablet 3    pantoprazole (PROTONIX) 40 mg tablet Take 1 tablet (40 mg total) by mouth daily in the early morning (Patient not taking: Reported on 4/18/2022 ) 30 tablet 0     No current facility-administered medications for this visit  Allergies   Allergen Reactions    Amoxicillin Hives, Rash and Shortness Of Breath    Azithromycin Shortness Of Breath    Cucurbita Anaphylaxis    Guaifenesin Shortness Of Breath     hives and sob      Ketorolac Tromethamine Shortness Of Breath    Levofloxacin Hives and Shortness Of Breath    Nitrofurantoin Hives, Rash and Shortness Of Breath    Peanut [Nuts - Food Allergy] Shortness Of Breath    Sertraline Other (See Comments) and Shortness Of Breath    Shellfish-Derived Products - Food Allergy Shortness Of Breath    Sulfamethoxazole-Trimethoprim Rash and Shortness Of Breath    Wheat Dextrin - Food Allergy Shortness Of Breath and GI Intolerance    Medical Tape Rash    Other Rash    Prednisone Rash       Review of Systems   Constitutional: Negative for chills, fatigue and fever  HENT: Negative for congestion, nosebleeds, postnasal drip, rhinorrhea, sinus pressure and sore throat  Eyes: Negative for discharge, redness and itching  Respiratory: Positive for shortness of breath and wheezing  Negative for cough, choking, chest tightness and stridor  Cardiovascular: Negative for chest pain, palpitations and leg swelling  Gastrointestinal: Negative for blood in stool  Genitourinary: Negative for difficulty urinating and dysuria  Musculoskeletal: Negative for arthralgias, joint swelling and myalgias  Skin: Negative for color change and rash  Neurological: Negative for light-headedness and headaches  Hematological: Negative for adenopathy         Video Exam    There were no vitals filed for this visit     Physical Exam  Constitutional:       General: She is not in acute distress  Appearance: Normal appearance  She is well-developed  She is not ill-appearing, toxic-appearing or diaphoretic  HENT:      Head: Normocephalic  Eyes:      Conjunctiva/sclera: Conjunctivae normal    Pulmonary:      Effort: Pulmonary effort is normal    Neurological:      Mental Status: She is alert and oriented to person, place, and time  Psychiatric:         Behavior: Behavior normal          Thought Content: Thought content normal           Pulmonary Function Test March 15, 2022  Results:  FEV1/FVC Ratio: 86 %  FEV1: 2 26 L     97 % predicted  Forced Vital Capacity: 2 63 L    90 % predicted  After administration of bronchodilator: No change     Lung volumes: Total Lung Capacity 81 % predicted Residual volume 67 % predicted     DLCO corrected for patients hemoglobin level: 62 % predicted     Flow volume loop: Normal     Interpretation:     · Normal spirometry  · No change with bronchodilators  · Normal lung volumes  · Mild reduction in diffusion capacity        VIRTUAL VISIT [address] verbally agrees to participate in [employer]  Pt is aware that [employer] could be limited without vital signs or the ability to perform a full hands-on physical exam  [facility] understands she or the provider may request at any time to terminate the video visit and request the patient to seek care or treatment in person

## 2022-04-18 NOTE — ASSESSMENT & PLAN NOTE
Work related asthma versus environmentally driven asthma is suspected  Patient has now relocated to a different area in her job  Overall symptoms fairly well controlled though she is experiencing some chest tightness with exertion  Symptoms typically relieved with use of albuterol  We discussed use of Singulair today  I recommend that she try the medication  If she experiences any adverse effects she will stop the medication and let me know  Could consider definitive testing for asthma but will defer at this time and treat symptomatically    Plan     Continue albuterol as needed  Start Singulair  Could consider methacholine challenge study in the future  Continue environmental allergy treatment  She has relocated to a different department in her job  Repeat CT chest due in October for lung cancer surveillance

## 2022-04-18 NOTE — ASSESSMENT & PLAN NOTE
Secondary to postnasal drip possibly underlying environmental allergies and asthma  Much better controlled  Plan  Continue sinonasal rinses, Flonase, over-the-counter antihistamines  Recommend she start Singulair  If she does experience any adverse effects then stop medication and let me know

## 2022-07-18 ENCOUNTER — TELEPHONE (OUTPATIENT)
Dept: PULMONOLOGY | Facility: CLINIC | Age: 62
End: 2022-07-18

## 2022-09-07 ENCOUNTER — OFFICE VISIT (OUTPATIENT)
Dept: PULMONOLOGY | Facility: CLINIC | Age: 62
End: 2022-09-07
Payer: COMMERCIAL

## 2022-09-07 VITALS
HEIGHT: 62 IN | SYSTOLIC BLOOD PRESSURE: 111 MMHG | DIASTOLIC BLOOD PRESSURE: 62 MMHG | WEIGHT: 153.8 LBS | HEART RATE: 64 BPM | TEMPERATURE: 97.2 F | BODY MASS INDEX: 28.3 KG/M2 | OXYGEN SATURATION: 97 %

## 2022-09-07 DIAGNOSIS — J21.9 BRONCHIOLITIS: ICD-10-CM

## 2022-09-07 DIAGNOSIS — Z85.118 HISTORY OF LUNG CANCER: ICD-10-CM

## 2022-09-07 DIAGNOSIS — R05.9 COUGH: Primary | ICD-10-CM

## 2022-09-07 PROCEDURE — 99214 OFFICE O/P EST MOD 30 MIN: CPT | Performed by: INTERNAL MEDICINE

## 2022-09-07 NOTE — PROGRESS NOTES
Pulmonary Follow Up Note   [facility] 58 y o  female MRN: [id number]  9/7/2022      Assessment:    Cough  Patient reports a chronic intermittent cough  Previously this is felt to be secondary to postnasal drip, environmental allergies and possibly underlying asthma  Unfortunately she was intolerant to Singulair  She is still using her sinonasal medications and PPI  Denies any significant wheezing but does report improvement with bronchodilator therapy  We did discuss empiric treatment using ICS therapy  However she is a little bit reluctant to try steroids given previous a development of diffuse rash with prednisone  Plan  Will refer for methacholine challenge study  Continue albuterol as needed  Continue Flonase, PPI  Have ordered repeat CT of the chest to reassess ground-glass opacities, bronchiolitis and also history of non-small cell lung cancer treated with left lower lobe lobectomy in 2018  Plan      History of lung cancer  Previous left lower lobectomy in 2018  Subsequent cancer surveillance unremarkable  Will coordinate care with Oncology per patient's request though I anticipate that she does not require further surveillance outside of repeat imaging  Have ordered repeat CT of the chest         Plan:    Diagnoses and all orders for this visit:    Cough  -     Methacholine challenge; Future  -     CT chest wo contrast; Future    Bronchiolitis  -     CT chest wo contrast; Future    History of lung cancer    Other orders  -     FLUTICASONE PROPIONATE, NASAL, NA; into each nostril        No follow-ups on file  History of Present Illness   HPI:  [facility] is a 58 y o  female history of non-small cell lung cancer (adenocarcinoma) involving left lower lobe (diagnosed in June 2018) and treated with left lower lobe lobectomy (no adjuvant chemotherapy)  Patient also has history of COVID-19 pneumonia diagnosed November 2021, obstructive sleep apnea, anxiety/depression    She was admitted to [facility] between 1/13/2022 and 1/14/2022 when she presented to the emergency room due to right-sided chest pain and shortness of breath  CT angiogram showed no evidence of pulmonary embolism but bilateral ground-glass opacities as well as interlobular septal thickening and a small left trace pleural effusion with stable lymphadenopathy  COVID-19/influenza/RSV PCR were negative  She was admitted and has been prescribed ceftriaxone and solumedrol 40 mg q8 hours    She was referred seen for follow-up by MARY Johnson on 2/8  At that time her CT images reviewed  6 minute walk study was performed and no desaturation occurred  Follow-up CT of the chest was obtained on February 24th and showed interval resolution of the ground-glass opacities with small centrilobular ground-glass nodules compatible with respiratory bronchiolitis  There is no significant mediastinal adenopathy  She is overall feeling pretty good  No longer experiencing any shortness of breath  However does have a persistent cough  Also has some mid epigastric abdominal pain  This is potentially related to coughing  Has postnasal drip  She does have have a Neti pot but does not use it regularly  She has been trying to increase her exertional activity, but she develops a "gripping" in her abdominal area and back and dyspnea  States she was given empiric diagnosis of asthma but never underwent PFTs  Here with her sister today  She does not have any acute complaints  Here today for follow-up   States overall she is doing well  She does have increased sob during humid days  Also reports nighttime coughing at times  She uses her albuterol and her nebulizer  She continues to use Flonase and Zyrtec  Singular caused GI upset/diarrhea  States her GERD is controlled Protonix  She does not have any acute complaints  Review of Systems   Constitutional: Negative for chills, fatigue and fever     HENT: Negative for congestion, nosebleeds, postnasal drip, rhinorrhea, sinus pressure and sore throat  Eyes: Negative for discharge, redness and itching  Respiratory: Positive for cough and shortness of breath  Negative for choking, chest tightness, wheezing and stridor  Cardiovascular: Negative for chest pain, palpitations and leg swelling  Gastrointestinal: Negative for blood in stool  Genitourinary: Negative for difficulty urinating and dysuria  Musculoskeletal: Negative for arthralgias, joint swelling and myalgias  Skin: Negative for color change and rash  Neurological: Negative for light-headedness and headaches  Hematological: Negative for adenopathy         Historical Information   Past Medical History:   Diagnosis Date    Lung cancer ([address] )     NICKO (obstructive sleep apnea)     Ovarian cyst     Pneumonia     Psychiatric disorder     Anxiety, depression     Past Surgical History:   Procedure Laterality Date    CARPAL TUNNEL RELEASE Right     CERVICAL CONE BIOPSY      CHOLECYSTECTOMY      COLONOSCOPY      EGD      ENDOMETRIAL ABLATION      LEFT OOPHORECTOMY  2007    LUNG LOBECTOMY Left 2018    NASAL SEPTUM SURGERY      TUBAL LIGATION       Family History   Problem Relation Age of Onset    Depression Mother     Alcohol abuse Father     Skin cancer Father    [facility] Migraines Sister     Hypertension Brother          Meds/Allergies     Current Outpatient Medications:     albuterol (PROVENTIL HFA,VENTOLIN HFA) 90 mcg/act inhaler, Inhale 2 puffs every 4 (four) hours as needed for wheezing or shortness of breath, Disp: 18 g, Rfl: 0    cetirizine (ZyrTEC) 10 MG chewable tablet, Chew 10 mg daily, Disp: , Rfl:     fluticasone (FLONASE) 50 mcg/act nasal spray, 1 spray into each nostril daily, Disp: 16 g, Rfl: 3    pantoprazole (PROTONIX) 40 mg tablet, Take 1 tablet (40 mg total) by mouth daily in the early morning, Disp: 30 tablet, Rfl: 0    Ascorbic Acid (VITAMIN C PO), Take by mouth (Patient not taking: Reported on 9/7/2022), Disp: , Rfl:     benzonatate (TESSALON PERLES) 100 mg capsule, Take 1 capsule (100 mg total) by mouth 3 (three) times a day as needed for cough (Patient not taking: Reported on 9/7/2022), Disp: 20 capsule, Rfl: 0    cholecalciferol (VITAMIN D3) 1,000 units tablet, Take 1,000 Units by mouth daily (Patient not taking: Reported on 9/7/2022), Disp: , Rfl:     FLUTICASONE PROPIONATE, NASAL, NA, into each nostril, Disp: , Rfl:     montelukast (SINGULAIR) 10 mg tablet, Take 1 tablet (10 mg total) by mouth daily at bedtime (Patient not taking: No sig reported), Disp: 90 tablet, Rfl: 3    Multiple Vitamins-Minerals (VITAMIN D3 COMPLETE PO), Take by mouth (Patient not taking: Reported on 9/7/2022), Disp: , Rfl:     Zinc Sulfate (ZINC 15 PO), Take by mouth (Patient not taking: Reported on 9/7/2022), Disp: , Rfl:   Allergies   Allergen Reactions    Amoxicillin Hives, Rash and Shortness Of Breath    Azithromycin Shortness Of Breath    Bupropion Other (See Comments) and Shortness Of Breath     Other reaction(s): Other (See Comments), SHORT OF BREATH    Cucurbita Anaphylaxis    Guaifenesin Shortness Of Breath     hives and sob      Ibuprofen Hives     Other reaction(s): RASH    Ketorolac Tromethamine Shortness Of Breath    Levofloxacin Hives and Shortness Of Breath    Nitrofurantoin Hives, Rash and Shortness Of Breath    Peanut [Nuts - Food Allergy] Shortness Of Breath    Sertraline Other (See Comments) and Shortness Of Breath    Shellfish-Derived Products - Food Allergy Shortness Of Breath    Sulfamethoxazole-Trimethoprim Rash and Shortness Of Breath    Wheat Dextrin - Food Allergy Shortness Of Breath and GI Intolerance    Medical Tape Rash    Other Rash    Prednisone Rash       Vitals: Blood pressure 111/62, pulse 64, temperature (!) 97 2 °F (36 2 °C), temperature source Tympanic, height 5' 2" (1 575 m), weight 69 8 kg (153 lb 12 8 oz), SpO2 97 %   Body mass index is 28 13 kg/m²  Oxygen Therapy  SpO2: 97 %  Oxygen Therapy: None (Room air)      Physical Exam  Physical Exam  Vitals reviewed  Constitutional:       General: She is not in acute distress  Appearance: She is well-developed  She is not ill-appearing, toxic-appearing or diaphoretic  HENT:      Head: Normocephalic and atraumatic  Right Ear: External ear normal       Left Ear: External ear normal       Nose: Nose normal  No congestion or rhinorrhea  Mouth/Throat:      Pharynx: No oropharyngeal exudate or posterior oropharyngeal erythema  Eyes:      General: No scleral icterus  Right eye: No discharge  Left eye: No discharge  Conjunctiva/sclera: Conjunctivae normal       Pupils: Pupils are equal, round, and reactive to light  Neck:      Trachea: No tracheal deviation  Cardiovascular:      Rate and Rhythm: Normal rate and regular rhythm  Heart sounds: Normal heart sounds  No murmur heard  No friction rub  No gallop  Pulmonary:      Effort: Pulmonary effort is normal       Breath sounds: Normal breath sounds  No stridor  No wheezing or rales  Musculoskeletal:      Cervical back: Normal range of motion  Right lower leg: No edema  Left lower leg: No edema  Lymphadenopathy:      Head:      Right side of head: No submental, submandibular, preauricular or posterior auricular adenopathy  Left side of head: No submental, submandibular, preauricular or posterior auricular adenopathy  Cervical: No cervical adenopathy  Skin:     General: Skin is warm and dry  Findings: No lesion or rash  Neurological:      Mental Status: She is alert and oriented to person, place, and time  Labs: I have personally reviewed pertinent lab results    Lab Results   Component Value Date    WBC 9 23 01/13/2022    HGB 12 3 01/13/2022    HCT 37 5 01/13/2022    MCV 93 01/13/2022     01/13/2022     Lab Results   Component Value Date    CALCIUM 8 5 01/13/2022    K 4 1 01/13/2022    CO2 25 01/13/2022     01/13/2022    BUN 14 01/13/2022    CREATININE 0 89 01/13/2022     No results found for: IGE  Lab Results   Component Value Date    ALT 25 01/06/2022    AST 36 01/06/2022    ALKPHOS 83 01/06/2022       Imaging and other studies: I have personally reviewed pertinent reports  and I have personally reviewed pertinent films in PACS    CT chest February 24, 2022  IMPRESSION:     1  Interval resolution of previously seen patchy groundglass pulmonary opacities, compatible with resolution of pneumonia  2   Diffuse tiny centrilobular groundglass nodules compatible with respiratory bronchiolitis      Pulmonary function testing:     PFT 3/2015  Results:  FEV1/FVC Ratio: 86 %  FEV1: 2 26 L     97 % predicted  Forced Vital Capacity: 2 63 L    90 % predicted  After administration of bronchodilator: No change     Lung volumes: Total Lung Capacity 81 % predicted Residual volume 67 % predicted     DLCO corrected for patients hemoglobin level: 62 % predicted     Flow volume loop: Normal     Interpretation:     · Normal spirometry  · No change with bronchodilators  · Normal lung volumes  · Mild reduction in diffusion capacity               6MWT 2/2022     Patient started with an SpO2 of 97% on room air and heart rate of 83   She was ambulating well for 6 minutes   Ending SpO2 of 94% with a heart rate of 112  [facility]

## 2022-09-07 NOTE — ASSESSMENT & PLAN NOTE
Previous left lower lobectomy in 2018  Subsequent cancer surveillance unremarkable  Will coordinate care with Oncology per patient's request though I anticipate that she does not require further surveillance outside of repeat imaging    Have ordered repeat CT of the chest

## 2022-09-07 NOTE — LETTER
September 7, 2022     Patient: Shelvy Shone  YOB: 1960  Date of Visit: 9/7/2022      To Whom it May Concern:    Shelvy Shone is under my professional care  Gil Owusu was seen in my office on 9/7/2022  If you have any questions or concerns, please don't hesitate to call           Sincerely,          Natalie Yeung MD        CC: No Recipients

## 2022-09-07 NOTE — ASSESSMENT & PLAN NOTE
Patient reports a chronic intermittent cough  Previously this is felt to be secondary to postnasal drip, environmental allergies and possibly underlying asthma  Unfortunately she was intolerant to Singulair  She is still using her sinonasal medications and PPI  Denies any significant wheezing but does report improvement with bronchodilator therapy  We did discuss empiric treatment using ICS therapy  However she is a little bit reluctant to try steroids given previous a development of diffuse rash with prednisone  Plan  Will refer for methacholine challenge study  Continue albuterol as needed  Continue Flonase, PPI  Have ordered repeat CT of the chest to reassess ground-glass opacities, bronchiolitis and also history of non-small cell lung cancer treated with left lower lobe lobectomy in 2018       Plan

## 2022-09-13 ENCOUNTER — TELEPHONE (OUTPATIENT)
Dept: PULMONOLOGY | Facility: CLINIC | Age: 62
End: 2022-09-13

## 2022-09-13 DIAGNOSIS — Z85.118 HISTORY OF LUNG CANCER: Primary | ICD-10-CM

## 2022-09-13 NOTE — TELEPHONE ENCOUNTER
Pt called re: she needs Dr Gregg Frey to place a referal to a hematologist/oncologist named Dr Hernandez Form    Please advise: [phone]

## 2022-10-04 ENCOUNTER — HOSPITAL ENCOUNTER (OUTPATIENT)
Dept: CT IMAGING | Facility: HOSPITAL | Age: 62
Discharge: HOME | End: 2022-10-04
Attending: INTERNAL MEDICINE
Payer: COMMERCIAL

## 2022-10-04 ENCOUNTER — HOSPITAL ENCOUNTER (OUTPATIENT)
Dept: PULMONOLOGY | Facility: HOSPITAL | Age: 62
Discharge: HOME | End: 2022-10-04
Attending: INTERNAL MEDICINE
Payer: COMMERCIAL

## 2022-10-04 DIAGNOSIS — J21.9 BRONCHIOLITIS: ICD-10-CM

## 2022-10-04 DIAGNOSIS — R05.9 COUGH: ICD-10-CM

## 2022-10-04 PROCEDURE — G1004 CDSM NDSC: HCPCS

## 2022-10-04 PROCEDURE — 94760 N-INVAS EAR/PLS OXIMETRY 1: CPT

## 2022-10-04 PROCEDURE — 71250 CT THORAX DX C-: CPT

## 2022-10-04 PROCEDURE — 94060 EVALUATION OF WHEEZING: CPT

## 2022-10-04 PROCEDURE — 94726 PLETHYSMOGRAPHY LUNG VOLUMES: CPT

## 2022-10-04 PROCEDURE — 94070 EVALUATION OF WHEEZING: CPT

## 2022-10-04 PROCEDURE — 94070 EVALUATION OF WHEEZING: CPT | Performed by: INTERNAL MEDICINE

## 2022-10-04 PROCEDURE — 94726 PLETHYSMOGRAPHY LUNG VOLUMES: CPT | Performed by: INTERNAL MEDICINE

## 2022-10-04 RX ORDER — ALBUTEROL SULFATE 2.5 MG/3ML
2.5 SOLUTION RESPIRATORY (INHALATION) ONCE AS NEEDED
Status: COMPLETED | OUTPATIENT
Start: 2022-10-04 | End: 2022-10-04

## 2022-10-04 RX ADMIN — Medication 0.06 MG: at 16:14

## 2022-10-04 RX ADMIN — ALBUTEROL SULFATE 2.5 MG: 2.5 SOLUTION RESPIRATORY (INHALATION) at 16:44

## 2022-10-11 PROBLEM — R05.9 COUGH: Status: RESOLVED | Noted: 2022-03-02 | Resolved: 2022-10-11

## 2022-10-12 PROBLEM — J84.9 BILATERAL INTERSTITIAL PNEUMONIA (HCC): Status: RESOLVED | Noted: 2022-02-08 | Resolved: 2022-10-12

## 2022-10-14 ENCOUNTER — OFFICE VISIT (OUTPATIENT)
Dept: PULMONOLOGY | Facility: CLINIC | Age: 62
End: 2022-10-14
Payer: COMMERCIAL

## 2022-10-14 VITALS
OXYGEN SATURATION: 97 % | BODY MASS INDEX: 27.42 KG/M2 | HEIGHT: 62 IN | DIASTOLIC BLOOD PRESSURE: 60 MMHG | TEMPERATURE: 97.2 F | SYSTOLIC BLOOD PRESSURE: 107 MMHG | HEART RATE: 76 BPM | WEIGHT: 149 LBS

## 2022-10-14 DIAGNOSIS — J18.9 PNEUMONITIS: ICD-10-CM

## 2022-10-14 DIAGNOSIS — Z85.118 HISTORY OF LUNG CANCER: Primary | ICD-10-CM

## 2022-10-14 PROBLEM — J98.4 PNEUMONITIS: Status: ACTIVE | Noted: 2022-10-14

## 2022-10-14 PROBLEM — J45.21 MILD INTERMITTENT ASTHMA WITH ACUTE EXACERBATION: Status: RESOLVED | Noted: 2022-01-14 | Resolved: 2022-10-14

## 2022-10-14 PROCEDURE — 99214 OFFICE O/P EST MOD 30 MIN: CPT | Performed by: INTERNAL MEDICINE

## 2022-10-14 NOTE — ASSESSMENT & PLAN NOTE
Previous left lower lobectomy in 2018  Subsequent cancer surveillance unremarkable  Repeat CT of chest without evidence of recurrence  Patient will continue follow with Oncology/Cardiothoracic surgery

## 2022-10-14 NOTE — ASSESSMENT & PLAN NOTE
Patient previously developed pneumonitis potentially secondary to occupational exposures vs other etiology  Since that time she has relocated to a new position  Her most recent CT chest shows resolution of ground-glass opacities  She is no longer symptomatic  She is doing very well in her new work position  Testing not congruent with asthma  This is been removed as a diagnosis from her chart per her request      Plan  Does not require further radiographic follow-up for the pneumonitis but will undergo surveillance CTs for her history of lung cancer    Follow-up in 1 year

## 2022-10-14 NOTE — PROGRESS NOTES
Pulmonary Follow Up Note   Nico Cota 58 y o  female MRN: [id number]  10/14/2022      Assessment:    History of lung cancer  Previous left lower lobectomy in 2018  Subsequent cancer surveillance unremarkable  Repeat CT of chest without evidence of recurrence  Patient will continue follow with Oncology/Cardiothoracic surgery  Pneumonitis  Patient previously developed pneumonitis potentially secondary to occupational exposures vs other etiology  Since that time she has relocated to a new position  Her most recent CT chest shows resolution of ground-glass opacities  She is no longer symptomatic  She is doing very well in her new work position  Testing not congruent with asthma  This is been removed as a diagnosis from her chart per her request      Plan  Does not require further radiographic follow-up for the pneumonitis but will undergo surveillance CTs for her history of lung cancer  Follow-up in 1 year      Plan:    Diagnoses and all orders for this visit:    History of lung cancer    Pneumonitis        Return in about 1 year (around 10/14/2023)  History of Present Illness   HPI:  Nico Cota is a 58 y o  female history of non-small cell lung cancer (adenocarcinoma) involving left lower lobe (diagnosed in June 2018) and treated with left lower lobe lobectomy (no adjuvant chemotherapy)  Patient also has history of COVID-19 pneumonia diagnosed November 2021, obstructive sleep apnea, anxiety/depression  She was admitted to [facility] between 1/13/2022 and 1/14/2022 when she presented to the emergency room due to right-sided chest pain and shortness of breath  CT angiogram showed no evidence of pulmonary embolism but bilateral ground-glass opacities as well as interlobular septal thickening and a small left trace pleural effusion with stable lymphadenopathy  COVID-19/influenza/RSV PCR were negative      She was admitted and has been prescribed ceftriaxone and solumedrol 40 mg q8 hours    She was referred seen for follow-up by MARY Harris on 2/8  At that time her CT images reviewed  6 minute walk study was performed and no desaturation occurred  Follow-up CT of the chest was obtained on February 24th and showed interval resolution of the ground-glass opacities with small centrilobular ground-glass nodules compatible with respiratory bronchiolitis  There is no significant mediastinal adenopathy  Here today for followup   Patient was last seen in September  At that time we discussed her chronic intermittent cough  She was referred for methacholine challenge study  Albuterol, Flonase and PPI were continued  Repeat CT of the chest was ordered to reassess ground-glass opacities in bronchiolitis in the context of history of non-small cell lung cancer involving left lower lobe treated with lobectomy 2018  Repeat CT of the chest was obtained on October 4th and showed stability of the left lower lobe lobectomy and no other suspicious nodules or masses  Methacholine challenge study showed no bronchial hyperresponsiveness  States that she is doing well and has a new position at her job and feels less stressed  She is not experiencing any respiratory symptoms  She plans on starting an exercise routine  Review of Systems   Constitutional: Negative for chills, fatigue and fever  HENT: Negative for congestion, nosebleeds, postnasal drip, rhinorrhea, sinus pressure and sore throat  Eyes: Negative for discharge, redness and itching  Respiratory: Negative for cough, choking, chest tightness, shortness of breath, wheezing and stridor  Cardiovascular: Negative for chest pain, palpitations and leg swelling  Gastrointestinal: Negative for blood in stool  Genitourinary: Negative for difficulty urinating and dysuria  Musculoskeletal: Negative for arthralgias, joint swelling and myalgias  Skin: Negative for color change and rash     Neurological: Negative for light-headedness and headaches  Hematological: Negative for adenopathy         Historical Information   Past Medical History:   Diagnosis Date   • Lung cancer ([address] )    • NICKO (obstructive sleep apnea)    • Ovarian cyst    • Pneumonia    • Psychiatric disorder     Anxiety, depression     Past Surgical History:   Procedure Laterality Date   • CARPAL TUNNEL RELEASE Right    • CERVICAL CONE BIOPSY     • CHOLECYSTECTOMY     • COLONOSCOPY     • EGD     • ENDOMETRIAL ABLATION     • LEFT OOPHORECTOMY  2007   • LUNG LOBECTOMY Left 2018   • NASAL SEPTUM SURGERY     • TUBAL LIGATION       Family History   Problem Relation Age of Onset   • Depression Mother    • Alcohol abuse Father    • Skin cancer Father    • Migraines Sister    • Hypertension Brother          Meds/Allergies     Current Outpatient Medications:   •  albuterol (PROVENTIL HFA,VENTOLIN HFA) 90 mcg/act inhaler, Inhale 2 puffs every 4 (four) hours as needed for wheezing or shortness of breath, Disp: 18 g, Rfl: 0  •  cetirizine (ZyrTEC) 10 MG chewable tablet, Chew 10 mg daily, Disp: , Rfl:   •  cholecalciferol (VITAMIN D3) 1,000 units tablet, Take 1,000 Units by mouth daily, Disp: , Rfl:   •  fluticasone (FLONASE) 50 mcg/act nasal spray, 1 spray into each nostril daily, Disp: 16 g, Rfl: 3  •  Multiple Vitamins-Minerals (VITAMIN D3 COMPLETE PO), Take by mouth, Disp: , Rfl:   •  pantoprazole (PROTONIX) 40 mg tablet, Take 1 tablet (40 mg total) by mouth daily in the early morning, Disp: 30 tablet, Rfl: 0  •  Ascorbic Acid (VITAMIN C PO), Take by mouth (Patient not taking: Reported on 9/7/2022), Disp: , Rfl:   •  benzonatate (TESSALON PERLES) 100 mg capsule, Take 1 capsule (100 mg total) by mouth 3 (three) times a day as needed for cough (Patient not taking: Reported on 9/7/2022), Disp: 20 capsule, Rfl: 0  •  FLUTICASONE PROPIONATE, NASAL, NA, into each nostril, Disp: , Rfl:   •  montelukast (SINGULAIR) 10 mg tablet, Take 1 tablet (10 mg total) by mouth daily at bedtime (Patient not taking: No sig reported), Disp: 90 tablet, Rfl: 3  •  Zinc Sulfate (ZINC 15 PO), Take by mouth (Patient not taking: Reported on 9/7/2022), Disp: , Rfl:   Allergies   Allergen Reactions   • Amoxicillin Hives, Rash and Shortness Of Breath   • Azithromycin Shortness Of Breath   • Bupropion Other (See Comments) and Shortness Of Breath     Other reaction(s): Other (See Comments), SHORT OF BREATH   • Cucurbita Anaphylaxis   • Guaifenesin Shortness Of Breath     hives and sob     • Ibuprofen Hives     Other reaction(s): RASH   • Ketorolac Tromethamine Shortness Of Breath   • Levofloxacin Hives and Shortness Of Breath   • Nitrofurantoin Hives, Rash and Shortness Of Breath   • Peanut [Nuts - Food Allergy] Shortness Of Breath   • Sertraline Other (See Comments) and Shortness Of Breath   • Shellfish-Derived Products - Food Allergy Shortness Of Breath   • Sulfamethoxazole-Trimethoprim Rash and Shortness Of Breath   • Wheat Dextrin - Food Allergy Shortness Of Breath and GI Intolerance   • Medical Tape Rash   • Other Rash   • Prednisone Rash       Vitals: Blood pressure 107/60, pulse 76, temperature (!) 97 2 °F (36 2 °C), temperature source Tympanic, height 5' 2" (1 575 m), weight 67 6 kg (149 lb), SpO2 97 %  Body mass index is 27 25 kg/m²  Oxygen Therapy  SpO2: 97 %  Oxygen Therapy: None (Room air)      Physical Exam  Physical Exam  Vitals reviewed  Constitutional:       General: She is not in acute distress  Appearance: She is well-developed  She is not ill-appearing, toxic-appearing or diaphoretic  HENT:      Head: Normocephalic and atraumatic  Right Ear: External ear normal       Left Ear: External ear normal       Nose: Nose normal  No congestion or rhinorrhea  Mouth/Throat:      Pharynx: No oropharyngeal exudate or posterior oropharyngeal erythema  Eyes:      General: No scleral icterus  Right eye: No discharge  Left eye: No discharge        Conjunctiva/sclera: Conjunctivae normal       Pupils: Pupils are equal, round, and reactive to light  Neck:      Trachea: No tracheal deviation  Cardiovascular:      Rate and Rhythm: Normal rate and regular rhythm  Heart sounds: Normal heart sounds  No murmur heard  No friction rub  No gallop  Pulmonary:      Effort: Pulmonary effort is normal       Breath sounds: Normal breath sounds  No stridor  No wheezing or rales  Musculoskeletal:      Cervical back: Normal range of motion  Right lower leg: No edema  Left lower leg: No edema  Lymphadenopathy:      Head:      Right side of head: No submental, submandibular, preauricular or posterior auricular adenopathy  Left side of head: No submental, submandibular, preauricular or posterior auricular adenopathy  Cervical: No cervical adenopathy  Skin:     General: Skin is warm and dry  Findings: No lesion or rash  Neurological:      Mental Status: She is alert and oriented to person, place, and time  Labs: I have personally reviewed pertinent lab results  Lab Results   Component Value Date    WBC 9 23 01/13/2022    HGB 12 3 01/13/2022    HCT 37 5 01/13/2022    MCV 93 01/13/2022     01/13/2022     Lab Results   Component Value Date    CALCIUM 8 5 01/13/2022    K 4 1 01/13/2022    CO2 25 01/13/2022     01/13/2022    BUN 14 01/13/2022    CREATININE 0 89 01/13/2022     No results found for: IGE  Lab Results   Component Value Date    ALT 25 01/06/2022    AST 36 01/06/2022    ALKPHOS 83 01/06/2022       Imaging and other studies: I have personally reviewed pertinent reports  and I have personally reviewed pertinent films in PACS       CT chest October 4, 2022    LUNGS:  Patient appears to be status post left lower lobectomy  Previously seen numerous centrilobular nodules have largely resolved  No suspicious nodules or masses      PLEURA:  Unremarkable      HEART/GREAT VESSELS: Heart is unremarkable for patient's age    No thoracic aortic aneurysm      MEDIASTINUM AND ALESSANDRO:  Unremarkable      CHEST WALL AND LOWER NECK:  Unremarkable      VISUALIZED STRUCTURES IN THE UPPER ABDOMEN:  Hepatic cysts  Status post cholecystectomy      OSSEOUS STRUCTURES:  No acute fracture or destructive osseous lesion      IMPRESSION:     Status post left lower lobectomy  No suspicious lung nodules or masses  CT chest February 24, 2022  IMPRESSION:     1  Interval resolution of previously seen patchy groundglass pulmonary opacities, compatible with resolution of pneumonia  2   Diffuse tiny centrilobular groundglass nodules compatible with respiratory bronchiolitis      Pulmonary function testing:     PFT 3/2015  Results:  FEV1/FVC Ratio: 86 %  FEV1: 2 26 L     97 % predicted  Forced Vital Capacity: 2 63 L    90 % predicted  After administration of bronchodilator: No change     Lung volumes: Total Lung Capacity 81 % predicted Residual volume 67 % predicted     DLCO corrected for patients hemoglobin level: 62 % predicted     Flow volume loop: Normal     Interpretation:     · Normal spirometry  · No change with bronchodilators  · Normal lung volumes  · Mild reduction in diffusion capacity            6MWT 2/2022     Patient started with an SpO2 of 97% on room air and heart rate of 83   She was ambulating well for 6 minutes   Ending SpO2 of 94% with a heart rate of 112  GAURANG Galaviz    [facility]  Answers for HPI/ROS submitted by the patient on 10/13/2022  Which of the following makes your symptoms worse?: change in weather  Which of the following makes your symptoms better?: steroid inhaler

## 2022-10-27 ENCOUNTER — TELEPHONE (OUTPATIENT)
Dept: PULMONOLOGY | Facility: CLINIC | Age: 62
End: 2022-10-27

## 2022-10-27 NOTE — TELEPHONE ENCOUNTER
Patient is asking if we can fax her CT Results from 10/4/22 to her Oncologist Ella Hester, that he would like to review this at her appointment tomorrow   Please advise    Fax #[phone]

## 2022-11-04 DIAGNOSIS — Z85.118 PERSONAL HISTORY OF OTHER MALIGNANT NEOPLASM OF BRONCHUS AND LUNG: ICD-10-CM

## 2023-02-27 ENCOUNTER — TELEPHONE (OUTPATIENT)
Dept: UROLOGY | Facility: AMBULATORY SURGERY CENTER | Age: 63
End: 2023-02-27

## 2023-09-12 ENCOUNTER — TELEPHONE (OUTPATIENT)
Dept: PULMONOLOGY | Facility: CLINIC | Age: 63
End: 2023-09-12

## 2023-09-24 ENCOUNTER — ANESTHESIA EVENT (OUTPATIENT)
Dept: ANESTHESIOLOGY | Facility: HOSPITAL | Age: 63
End: 2023-09-24

## 2023-09-24 ENCOUNTER — ANESTHESIA (OUTPATIENT)
Dept: ANESTHESIOLOGY | Facility: HOSPITAL | Age: 63
End: 2023-09-24

## 2023-09-24 NOTE — ANESTHESIA PREPROCEDURE EVALUATION
Procedure:  PRE-OP ONLY    Relevant Problems   GI/HEPATIC   (+) Hepatic cyst      PULMONARY   (+) Acute respiratory failure with hypoxia (HCC)      Hx lung cancer    NICKO  Physical Exam    Airway    Mallampati score: II  TM Distance: >3 FB  Neck ROM: full     Dental   No notable dental hx     Cardiovascular  Cardiovascular exam normal    Pulmonary  Pulmonary exam normal     Other Findings        Anesthesia Plan  ASA Score- 3     Anesthesia Type- IV sedation with anesthesia with ASA Monitors. Additional Monitors:   Airway Plan:           Plan Factors-Exercise tolerance (METS): >4 METS. Chart reviewed. EKG reviewed. Imaging results reviewed. Existing labs reviewed. Patient summary reviewed. Patient is not a current smoker. Patient not instructed to abstain from smoking on day of procedure. Patient did not smoke on day of surgery. Obstructive sleep apnea risk education given perioperatively. Induction- intravenous. Postoperative Plan- Plan for postoperative opioid use. Informed Consent- Anesthetic plan and risks discussed with patient. I personally reviewed this patient with the CRNA. Discussed and agreed on the Anesthesia Plan with the CRNA. Bebe Cash

## 2023-09-25 ENCOUNTER — HOSPITAL ENCOUNTER (OUTPATIENT)
Dept: GASTROENTEROLOGY | Facility: HOSPITAL | Age: 63
Setting detail: OUTPATIENT SURGERY
Discharge: HOME | End: 2023-09-25
Attending: HOSPITALIST | Admitting: HOSPITALIST
Payer: COMMERCIAL

## 2023-09-25 ENCOUNTER — ANESTHESIA EVENT (OUTPATIENT)
Dept: GASTROENTEROLOGY | Facility: HOSPITAL | Age: 63
End: 2023-09-25

## 2023-09-25 ENCOUNTER — ANESTHESIA (OUTPATIENT)
Dept: GASTROENTEROLOGY | Facility: HOSPITAL | Age: 63
End: 2023-09-25

## 2023-09-25 VITALS
TEMPERATURE: 97.6 F | DIASTOLIC BLOOD PRESSURE: 73 MMHG | WEIGHT: 149 LBS | RESPIRATION RATE: 20 BRPM | HEART RATE: 62 BPM | SYSTOLIC BLOOD PRESSURE: 114 MMHG | BODY MASS INDEX: 27.42 KG/M2 | OXYGEN SATURATION: 100 % | HEIGHT: 62 IN

## 2023-09-25 DIAGNOSIS — R19.5 MUCUS IN STOOL: ICD-10-CM

## 2023-09-25 DIAGNOSIS — K62.5 BRBPR (BRIGHT RED BLOOD PER RECTUM): ICD-10-CM

## 2023-09-25 RX ORDER — LIDOCAINE HYDROCHLORIDE 10 MG/ML
INJECTION, SOLUTION EPIDURAL; INFILTRATION; INTRACAUDAL; PERINEURAL AS NEEDED
Status: DISCONTINUED | OUTPATIENT
Start: 2023-09-25 | End: 2023-09-25

## 2023-09-25 RX ORDER — SODIUM CHLORIDE, SODIUM LACTATE, POTASSIUM CHLORIDE, CALCIUM CHLORIDE 600; 310; 30; 20 MG/100ML; MG/100ML; MG/100ML; MG/100ML
INJECTION, SOLUTION INTRAVENOUS CONTINUOUS PRN
Status: DISCONTINUED | OUTPATIENT
Start: 2023-09-25 | End: 2023-09-25

## 2023-09-25 RX ORDER — PROPOFOL 10 MG/ML
INJECTION, EMULSION INTRAVENOUS AS NEEDED
Status: DISCONTINUED | OUTPATIENT
Start: 2023-09-25 | End: 2023-09-25

## 2023-09-25 RX ADMIN — SODIUM CHLORIDE, SODIUM LACTATE, POTASSIUM CHLORIDE, AND CALCIUM CHLORIDE: .6; .31; .03; .02 INJECTION, SOLUTION INTRAVENOUS at 09:43

## 2023-09-25 RX ADMIN — PROPOFOL 150 MCG/KG/MIN: 10 INJECTION, EMULSION INTRAVENOUS at 09:48

## 2023-09-25 RX ADMIN — LIDOCAINE HYDROCHLORIDE 50 MG: 10 INJECTION, SOLUTION EPIDURAL; INFILTRATION; INTRACAUDAL at 09:47

## 2023-09-25 RX ADMIN — PROPOFOL 100 MG: 10 INJECTION, EMULSION INTRAVENOUS at 09:47

## 2023-09-25 NOTE — H&P
Procedure(s):  Colonoscopy with indication(s) of CRC screening      Endoscopy Pre-Procedure Assessment:  Prior to the procedure, the patient is identified. The patient's history, medications, and allergies have been reviewed. The patient is competent. The risks and benefits of the procedure procedure and the planned sedation have been discussed with the patient. All questions have been answered and informed consent for the procedure has been obtained. Vitals:    09/25/23 0905   BP: 118/62   Pulse: 75   Resp: 18   Temp: 97.6 °F (36.4 °C)   SpO2: 97%       Physical Exam:  Physical Exam  Vitals reviewed. HENT:      Head: Normocephalic. Eyes:      Conjunctiva/sclera: Conjunctivae normal.   Cardiovascular:      Rate and Rhythm: Normal rate. Pulmonary:      Effort: Pulmonary effort is normal.   Abdominal:      Palpations: Abdomen is soft. Musculoskeletal:      Cervical back: Normal range of motion. Consent: We have discussed the procedure in detail. We reviewed risks, benefits and alternative as well as protentional complications including and not limited to medication side effect , infection, bleeding, perforation and the potential need for surgery, ICU admission, CPR, as well as the need for blood product transfusion. Patient verbalized understanding and agreement. All patient questions were answered. After reviewed the risks and benefits, the patient is deemed in satisfactory condition to undergo the procedure. The anesthesia plan is to use monitored anesthesia care (MAC).   09/25/23

## 2023-09-25 NOTE — ANESTHESIA POSTPROCEDURE EVALUATION
Post-Op Assessment Note    CV Status:  Stable  Pain Score: 0    Pain management: adequate     Mental Status:  Sleepy   Hydration Status:  Stable   PONV Controlled:  Controlled   Airway Patency:  Patent      Post Op Vitals Reviewed: Yes      Staff: CRNA         No notable events documented.     BP   96/55   Temp      Pulse  83   Resp   22   SpO2   99

## 2023-09-25 NOTE — ANESTHESIA PREPROCEDURE EVALUATION
Procedure:  COLONOSCOPY    Relevant Problems   GI/HEPATIC   (+) Hepatic cyst      PULMONARY   (+) Acute respiratory failure with hypoxia (HCC)      Hx lung cancer    NICKO  Physical Exam    Airway    Mallampati score: II  TM Distance: >3 FB  Neck ROM: full     Dental   No notable dental hx     Cardiovascular  Cardiovascular exam normal    Pulmonary  Pulmonary exam normal     Other Findings        Anesthesia Plan  ASA Score- 3     Anesthesia Type- IV sedation with anesthesia with ASA Monitors. Additional Monitors:   Airway Plan:           Plan Factors-Exercise tolerance (METS): >4 METS. Chart reviewed. EKG reviewed. Imaging results reviewed. Existing labs reviewed. Patient summary reviewed. Patient is not a current smoker. Patient not instructed to abstain from smoking on day of procedure. Patient did not smoke on day of surgery. Obstructive sleep apnea risk education given perioperatively. Induction- intravenous. Postoperative Plan- Plan for postoperative opioid use. Informed Consent- Anesthetic plan and risks discussed with patient. I personally reviewed this patient with the CRNA. Discussed and agreed on the Anesthesia Plan with the CRNA. Goyo Woo

## 2023-10-18 ENCOUNTER — HOSPITAL ENCOUNTER (OUTPATIENT)
Dept: CT IMAGING | Facility: HOSPITAL | Age: 63
Discharge: HOME | End: 2023-10-18
Payer: COMMERCIAL

## 2023-10-18 DIAGNOSIS — Z85.118 PERSONAL HISTORY OF OTHER MALIGNANT NEOPLASM OF BRONCHUS AND LUNG: ICD-10-CM

## 2023-10-18 PROCEDURE — 71250 CT THORAX DX C-: CPT

## 2024-06-01 ENCOUNTER — HOSPITAL ENCOUNTER (OUTPATIENT)
Dept: CT IMAGING | Facility: HOSPITAL | Age: 64
Discharge: HOME | End: 2024-06-01
Payer: COMMERCIAL

## 2024-06-01 DIAGNOSIS — Z85.118 PERSONAL HISTORY OF OTHER MALIGNANT NEOPLASM OF BRONCHUS AND LUNG: ICD-10-CM

## 2024-06-01 PROCEDURE — 71250 CT THORAX DX C-: CPT

## 2024-06-14 ENCOUNTER — OFFICE VISIT (OUTPATIENT)
Dept: FAMILY MEDICINE CLINIC | Facility: CLINIC | Age: 64
End: 2024-06-14
Payer: COMMERCIAL

## 2024-06-14 VITALS
HEART RATE: 85 BPM | DIASTOLIC BLOOD PRESSURE: 75 MMHG | OXYGEN SATURATION: 98 % | WEIGHT: 149.47 LBS | SYSTOLIC BLOOD PRESSURE: 130 MMHG | TEMPERATURE: 97.6 F | BODY MASS INDEX: 27.34 KG/M2

## 2024-06-14 DIAGNOSIS — Z87.42 HISTORY OF ABNORMAL CERVICAL PAP SMEAR: ICD-10-CM

## 2024-06-14 DIAGNOSIS — J30.1 SEASONAL ALLERGIC RHINITIS DUE TO POLLEN: ICD-10-CM

## 2024-06-14 DIAGNOSIS — J45.20 MILD INTERMITTENT ASTHMA WITHOUT STATUS ASTHMATICUS WITHOUT COMPLICATION: ICD-10-CM

## 2024-06-14 DIAGNOSIS — C34.32 MALIGNANT NEOPLASM OF LOWER LOBE OF LEFT LUNG (HCC): Primary | ICD-10-CM

## 2024-06-14 DIAGNOSIS — R10.32 GROIN PAIN, LEFT: ICD-10-CM

## 2024-06-14 DIAGNOSIS — L71.9 ROSACEA: ICD-10-CM

## 2024-06-14 DIAGNOSIS — F43.10 PTSD (POST-TRAUMATIC STRESS DISORDER): ICD-10-CM

## 2024-06-14 DIAGNOSIS — K76.89 HEPATIC CYST: ICD-10-CM

## 2024-06-14 DIAGNOSIS — E78.2 MIXED HYPERLIPIDEMIA: ICD-10-CM

## 2024-06-14 DIAGNOSIS — K21.9 GASTROESOPHAGEAL REFLUX DISEASE WITHOUT ESOPHAGITIS: ICD-10-CM

## 2024-06-14 PROBLEM — J30.9 ALLERGIC RHINITIS: Status: ACTIVE | Noted: 2018-06-15

## 2024-06-14 PROBLEM — Z90.2 S/P LOBECTOMY OF LUNG: Status: ACTIVE | Noted: 2018-08-30

## 2024-06-14 PROBLEM — C34.90 ADENOCARCINOMA OF LUNG (HCC): Status: ACTIVE | Noted: 2018-08-22

## 2024-06-14 PROBLEM — J98.4 PNEUMONITIS: Status: RESOLVED | Noted: 2022-10-14 | Resolved: 2024-06-14

## 2024-06-14 PROBLEM — K22.2 STRICTURE OF ESOPHAGUS: Status: ACTIVE | Noted: 2018-04-26

## 2024-06-14 PROBLEM — G47.33 OSA (OBSTRUCTIVE SLEEP APNEA): Status: ACTIVE | Noted: 2018-06-15

## 2024-06-14 PROBLEM — N90.0 VULVAR INTRAEPITHELIAL NEOPLASIA (VIN) GRADE 1: Status: ACTIVE | Noted: 2019-12-18

## 2024-06-14 PROBLEM — J96.01 ACUTE RESPIRATORY FAILURE WITH HYPOXIA (HCC): Status: RESOLVED | Noted: 2022-01-14 | Resolved: 2024-06-14

## 2024-06-14 PROBLEM — Z80.0 FAMILY HISTORY OF PANCREATIC CANCER: Status: ACTIVE | Noted: 2020-12-21

## 2024-06-14 PROBLEM — M54.12 CERVICAL RADICULOPATHY: Status: ACTIVE | Noted: 2018-04-26

## 2024-06-14 PROBLEM — E78.5 HYPERLIPIDEMIA: Status: ACTIVE | Noted: 2018-08-21

## 2024-06-14 PROCEDURE — 99205 OFFICE O/P NEW HI 60 MIN: CPT | Performed by: FAMILY MEDICINE

## 2024-06-14 RX ORDER — CLINDAMYCIN PHOSPHATE, BENZOYL PEROXIDE 25; 10 MG/G; MG/G
1 GEL TOPICAL
Qty: 50 G | Refills: 0 | Status: SHIPPED | OUTPATIENT
Start: 2024-06-14

## 2024-06-14 RX ORDER — PANTOPRAZOLE SODIUM 40 MG/1
40 TABLET, DELAYED RELEASE ORAL
Qty: 90 TABLET | Refills: 1 | Status: SHIPPED | OUTPATIENT
Start: 2024-06-14

## 2024-06-14 NOTE — ASSESSMENT & PLAN NOTE
Component  Ref Range & Units 2/10/24  7:01 AM   Cholesterol  <200 mg/dL 272 High    Triglycerides  <150 mg/dL 121   Cholesterol, HDL, Direct  23 - 92 mg/dL 81   Cholesterol, Non-HDL  <160 mg/dL 191 High    LDL Cholesterol  <130 mg/dL 167 High    Comment: LDL Cholesterol was calculated using the Friedewald equation. Direct measurement of LDL is not indicated for this patient based on [facility]'s analytical algorithm for measurement of LDL Cholesterol.   Chol/HDL Ratio 3.4     ASCVD risk 4.6% - low risk  Continue to monitor

## 2024-06-14 NOTE — ASSESSMENT & PLAN NOTE
With anxiety  Patient has followed with psychiatry in the past, last saw them about 3 years ago.  She reports trying and failing the following medications: Zoloft, Ativan, Elavil, Cymbalta, Wellbutrin, Topamax, BuSpar, Klonopin.  She reports all of these medication increased agitation for her and she did not tolerate them more than a week or 2.  She reports the only medication that helps is Xanax, but we discussed my recommendation would be for her to reestablish with psychiatry given the complex history of her failed medications.  Continue with counseling every 2 weeks as scheduled.

## 2024-06-14 NOTE — PROGRESS NOTES
Ambulatory Visit  Name: Dede Hernandez      : 1960      MRN: [id number]  Encounter Provider: Julia Hong DO  Encounter Date: 2024   Encounter department: [facility]IMARY [facility]    Assessment & Plan   1. Malignant neoplasm of lower lobe of left lung (HCC)  Assessment & Plan:  Follows with oncology - mgmt per specialist  2. Rosacea  Assessment & Plan:  Has failed treatment with topical steroids in the past  Will try topical clindamycin-BP  F/u 4 weeks to reassess  Orders:  -     Clindamycin Phos-Benzoyl Perox gel; Apply 1 Application topically daily at bedtime  3. Groin pain, left  Assessment & Plan:  Exam is not concerning for hernia - suspect pulled muscle - encourage regular stretching before work and PT if needed  Continue to monitor  Orders:  -     Ambulatory Referral to Physical Therapy; Future  4. PTSD (post-traumatic stress disorder)  Assessment & Plan:  With anxiety  Patient has followed with psychiatry in the past, last saw them about 3 years ago.  She reports trying and failing the following medications: Zoloft, Ativan, Elavil, Cymbalta, Wellbutrin, Topamax, BuSpar, Klonopin.  She reports all of these medication increased agitation for her and she did not tolerate them more than a week or 2.  She reports the only medication that helps is Xanax, but we discussed my recommendation would be for her to reestablish with psychiatry given the complex history of her failed medications.  Continue with counseling every 2 weeks as scheduled.  Orders:  -     Ambulatory referral to Psych Services; Future  5. Gastroesophageal reflux disease without esophagitis  Assessment & Plan:  Chronic, continue PPI, recommend prn pepcid in evening as well  Orders:  -     pantoprazole (PROTONIX) 40 mg tablet; Take 1 tablet (40 mg total) by mouth daily in the early morning  6. Seasonal allergic rhinitis due to pollen  Assessment & Plan:  Chronic, continue zyrtec, flonase  7. Mild intermittent  asthma without status asthmaticus without complication  Assessment & Plan:  Chronic, continue prn albuterol  8. Hepatic cyst  Assessment & Plan:  Stable on repeat CT of lung imaging done for monitoring of her lung cancer history - continue to monitor LFTs  9. Mixed hyperlipidemia  Assessment & Plan:  Component  Ref Range & Units 2/10/24  7:01 AM   Cholesterol  <200 mg/dL 272 High    Triglycerides  <150 mg/dL 121   Cholesterol, HDL, Direct  23 - 92 mg/dL 81   Cholesterol, Non-HDL  <160 mg/dL 191 High    LDL Cholesterol  <130 mg/dL 167 High    Comment: LDL Cholesterol was calculated using the Friedewald equation. Direct measurement of LDL is not indicated for this patient based on [facility]'s analytical algorithm for measurement of LDL Cholesterol.   Chol/HDL Ratio 3.4     ASCVD risk 4.6% - low risk  Continue to monitor   10. History of abnormal cervical Pap smear  Assessment & Plan:  Following with gyn with regular screening      Depression Screening and Follow-up Plan: Patient was screened for depression during today's encounter. They screened negative with a PHQ-2 score of 0.    Return in about 4 weeks (around 7/12/2024) for Annual physical.    History of Present Illness     HPI  Chief Complaint   Patient presents with    New Patient Visit     Est care, patient gets a cramp pain in her groan area and also discuss red spots on her face      PMH: lung cancer s/p left lower lobectomy in 2018, asthma vs COPD, HLD, esophageal stricture, cervical radiculopathy, Anxiety, PTSD, seasonal allergies, acne rosacea   SurgHx: Reviewed in chart and up-to-date  Allergies: add doxy, remainder of allergies reviewed with patient and up-to-date  Medications: Reviewed in chart and up-to-date  FamHx: uncle - colon cancer: mother - pancreatic cancer; Father -NH Lymphoma   SocialHx:   Tobacco: Quit about 10 years before she was diagnosed with lung cancer    Alcohol: rare social occasion    Relationship: single, 3 children, 5 grandchildren     Career:    Specialists: Gastro, Oncology     Review of Systems   Constitutional:  Negative for appetite change, chills, diaphoresis, fever and unexpected weight change.   Eyes:  Negative for visual disturbance.   Respiratory:  Negative for shortness of breath.    Cardiovascular:  Negative for chest pain and leg swelling.   Gastrointestinal:  Negative for constipation and diarrhea.   Endocrine: Negative for polydipsia and polyuria.   Genitourinary:  Negative for frequency.   Musculoskeletal:  Positive for myalgias (groin pain).   Skin:  Positive for rash.   Neurological:  Negative for dizziness, light-headedness and headaches.   Psychiatric/Behavioral:  The patient is nervous/anxious.      Current Outpatient Medications on File Prior to Visit   Medication Sig Dispense Refill    albuterol (PROVENTIL HFA,VENTOLIN HFA) 90 mcg/act inhaler Inhale 2 puffs every 4 (four) hours as needed for wheezing or shortness of breath 18 g 0    cetirizine (ZyrTEC) 10 MG chewable tablet Chew 10 mg daily      cholecalciferol (VITAMIN D3) 1,000 units tablet Take 1,000 Units by mouth daily      fluticasone (FLONASE) 50 mcg/act nasal spray 1 spray into each nostril daily 16 g 3    Multiple Vitamins-Minerals (VITAMIN D3 COMPLETE PO) Take by mouth      [DISCONTINUED] pantoprazole (PROTONIX) 40 mg tablet Take 1 tablet (40 mg total) by mouth daily in the early morning 30 tablet 0     No current facility-administered medications on file prior to visit.      Social History     Tobacco Use    Smoking status: Former     Current packs/day: 0.00     Types: Cigarettes     Quit date: 2008     Years since quittin.4    Smokeless tobacco: Never   Vaping Use    Vaping status: Never Used   Substance and Sexual Activity    Alcohol use: Not Currently    Drug use: Never    Sexual activity: Not on file     Objective     /75 (BP Location: Right arm, Patient Position: Sitting, Cuff Size: Standard)   Pulse 85   Temp 97.6 °F (36.4  °C) (Tympanic)   Wt 67.8 kg (149 lb 7.6 oz)   SpO2 98%   BMI 27.34 kg/m²     Physical Exam  Vitals reviewed.   Constitutional:       Appearance: She is normal weight.   HENT:      Head: Normocephalic and atraumatic.   Eyes:      Extraocular Movements: Extraocular movements intact.      Conjunctiva/sclera: Conjunctivae normal.   Cardiovascular:      Rate and Rhythm: Normal rate.   Pulmonary:      Effort: Pulmonary effort is normal.   Abdominal:      General: Abdomen is flat.      Palpations: Abdomen is soft.      Hernia: No hernia is present.   Musculoskeletal:         General: Tenderness (left groin tenderness) present.   Skin:     Findings: Rash (erythema, patchy and papular on bilateral cheek and nose, sparing nasal creases) present.   Neurological:      General: No focal deficit present.      Mental Status: She is alert.   Psychiatric:         Mood and Affect: Affect normal. Mood is anxious.         Behavior: Behavior normal.       Administrative Statements   I have spent a total time of 70 minutes on 06/14/24 In caring for this patient including Risks and benefits of tx options, Instructions for management, Patient and family education, Importance of tx compliance, Counseling / Coordination of care, Documenting in the medical record, Reviewing / ordering tests, medicine, procedures  , and Obtaining or reviewing history  .

## 2024-06-14 NOTE — ASSESSMENT & PLAN NOTE
Exam is not concerning for hernia - suspect pulled muscle - encourage regular stretching before work and PT if needed  Continue to monitor

## 2024-06-14 NOTE — ASSESSMENT & PLAN NOTE
Stable on repeat CT of lung imaging done for monitoring of her lung cancer history - continue to monitor LFTs

## 2024-06-14 NOTE — ASSESSMENT & PLAN NOTE
Has failed treatment with topical steroids in the past  Will try topical clindamycin-BP  F/u 4 weeks to reassess

## 2024-06-26 ENCOUNTER — TELEPHONE (OUTPATIENT)
Dept: FAMILY MEDICINE CLINIC | Facility: CLINIC | Age: 64
End: 2024-06-26

## 2024-06-26 ENCOUNTER — NURSE TRIAGE (OUTPATIENT)
Age: 64
End: 2024-06-26

## 2024-06-26 NOTE — TELEPHONE ENCOUNTER
Pt returned call and I advised pt of updated advisment. Pts headache is much better and is not having further issues at this time    Thank you

## 2024-06-26 NOTE — TELEPHONE ENCOUNTER
Left a message to try and set up an appt for today. Doctor suggests if her headache does not improve she should consider going to the ER.

## 2024-07-01 ENCOUNTER — TELEPHONE (OUTPATIENT)
Dept: PSYCHIATRY | Facility: CLINIC | Age: 64
End: 2024-07-01

## 2024-07-01 NOTE — TELEPHONE ENCOUNTER
Contacted patient in regards to Routine Referral in attempts to verify patient's needs of services and add patient to proper wait list. Writer left vm to call intake at [phone]    Attempt #2    Referral closed due to unable to contact pt.

## 2024-07-15 ENCOUNTER — TELEPHONE (OUTPATIENT)
Dept: ADMINISTRATIVE | Facility: OTHER | Age: 64
End: 2024-07-15

## 2024-07-15 NOTE — TELEPHONE ENCOUNTER
----- Message from Katina MERLOS sent at 7/15/2024 10:46 AM EDT -----  Regarding: Care gap request  07/15/24 10:46 AM    Hello, our patient No patient name on file. has had Pap Smear (HPV) aka Cervical Cancer Screening completed/performed. Please assist in updating the patient chart by pulling the Care Everywhere (CE) document. The date of service is 05/01/2024.     Thank you,  Katina BALBUENA [facility]

## 2024-07-15 NOTE — TELEPHONE ENCOUNTER
Upon review of the In Basket request we were able to locate, review, and update the patient chart as requested for Pap Smear (HPV) aka Cervical Cancer Screening.    Any additional questions or concerns should be emailed to the Practice Liaisons via the appropriate education email address, please do not reply via In Basket.    Thank you  Douglas Quiñonez MA   [facility]

## 2024-07-18 ENCOUNTER — OFFICE VISIT (OUTPATIENT)
Dept: FAMILY MEDICINE CLINIC | Facility: CLINIC | Age: 64
End: 2024-07-18
Payer: COMMERCIAL

## 2024-07-18 VITALS
SYSTOLIC BLOOD PRESSURE: 125 MMHG | WEIGHT: 153.88 LBS | HEART RATE: 89 BPM | DIASTOLIC BLOOD PRESSURE: 63 MMHG | BODY MASS INDEX: 28.15 KG/M2 | OXYGEN SATURATION: 98 %

## 2024-07-18 DIAGNOSIS — Z78.0 POSTMENOPAUSAL: ICD-10-CM

## 2024-07-18 DIAGNOSIS — N95.1 VASOMOTOR SYMPTOMS DUE TO MENOPAUSE: ICD-10-CM

## 2024-07-18 DIAGNOSIS — R40.0 DAYTIME SOMNOLENCE: ICD-10-CM

## 2024-07-18 DIAGNOSIS — Z11.4 SCREENING FOR HIV (HUMAN IMMUNODEFICIENCY VIRUS): ICD-10-CM

## 2024-07-18 DIAGNOSIS — Z00.00 ANNUAL PHYSICAL EXAM: Primary | ICD-10-CM

## 2024-07-18 DIAGNOSIS — Z11.59 NEED FOR HEPATITIS C SCREENING TEST: ICD-10-CM

## 2024-07-18 PROCEDURE — 99396 PREV VISIT EST AGE 40-64: CPT | Performed by: FAMILY MEDICINE

## 2024-07-18 NOTE — PROGRESS NOTES
Adult Annual Physical  Name: Dede Hernandez      : 1960      MRN: [id number]  Encounter Provider: Julia Hong DO  Encounter Date: 2024   Encounter department: [facility]    Assessment & Plan   1. Annual physical exam  2. Need for hepatitis C screening test  -     Hepatitis C Antibody; Future  3. Screening for HIV (human immunodeficiency virus)  -     HIV 1/2 AG/AB w Reflex [facility] for 2 yr old and above; Future  4. Vasomotor symptoms due to menopause  -     Ambulatory Referral to Obstetrics / Gynecology; Future  5. Postmenopausal  -     Ambulatory Referral to Obstetrics / Gynecology; Future  6. Daytime somnolence  -     Ambulatory Referral to Sleep Medicine; Future    Immunizations and preventive care screenings were discussed with patient today. Appropriate education was printed on patient's after visit summary. Patient still considering Prevnar, Shingles, RSV. Declined these today.          Return in about 3 months (around 10/18/2024) for Recheck Chronic health conditions .      History of Present Illness     Adult Annual Physical:  Patient presents for annual physical.     Diet and Physical Activity:  - Diet/Nutrition: low carb diet.  - Exercise: no formal exercise.    General Health:  - Sleep: 7-8 hours of sleep on average, daytime hypersomnolence, snores loudly and unrefreshing sleep.  - Hearing: normal hearing bilateral ears.  - Vision: goes for regular eye exams and wears glasses.  - Dental: regular dental visits.    /GYN Health:  - Follows with GYN: no.   - Menopause: postmenopausal.   - History of STDs: no    Review of Systems   Constitutional:  Positive for fatigue. Negative for appetite change, chills, diaphoresis, fever and unexpected weight change.   Eyes:  Negative for visual disturbance.   Respiratory:  Negative for shortness of breath.    Cardiovascular:  Negative for chest pain and leg swelling.   Gastrointestinal:  Negative for constipation and  diarrhea.   Endocrine: Negative for polydipsia and polyuria.   Genitourinary:  Negative for frequency.   Neurological:  Negative for dizziness, light-headedness and headaches.   Psychiatric/Behavioral:  Negative for dysphoric mood.      Current Outpatient Medications on File Prior to Visit   Medication Sig Dispense Refill    albuterol (PROVENTIL HFA,VENTOLIN HFA) 90 mcg/act inhaler Inhale 2 puffs every 4 (four) hours as needed for wheezing or shortness of breath 18 g 0    cetirizine (ZyrTEC) 10 MG chewable tablet Chew 10 mg daily      cholecalciferol (VITAMIN D3) 1,000 units tablet Take 1,000 Units by mouth daily      Clindamycin Phos-Benzoyl Perox gel Apply 1 Application topically daily at bedtime 50 g 0    fluticasone (FLONASE) 50 mcg/act nasal spray 1 spray into each nostril daily 16 g 3    Multiple Vitamins-Minerals (VITAMIN D3 COMPLETE PO) Take by mouth      pantoprazole (PROTONIX) 40 mg tablet Take 1 tablet (40 mg total) by mouth daily in the early morning 90 tablet 1     No current facility-administered medications on file prior to visit.      Social History     Tobacco Use    Smoking status: Former     Current packs/day: 0.00     Types: Cigarettes     Quit date: 2008     Years since quittin.5    Smokeless tobacco: Never   Vaping Use    Vaping status: Never Used   Substance and Sexual Activity    Alcohol use: Not Currently    Drug use: Never    Sexual activity: Not on file       Objective     /63 (BP Location: Right arm, Patient Position: Sitting, Cuff Size: Standard)   Pulse 89   Wt 69.8 kg (153 lb 14.1 oz)   SpO2 98%   BMI 28.15 kg/m²     Physical Exam  Vitals reviewed.   Constitutional:       Appearance: Normal appearance. She is normal weight.   HENT:      Head: Normocephalic and atraumatic.      Right Ear: Tympanic membrane, ear canal and external ear normal. There is no impacted cerumen.      Left Ear: Tympanic membrane, ear canal and external ear normal. There is no impacted cerumen.       Nose: Nose normal. No congestion.      Mouth/Throat:      Mouth: Mucous membranes are moist.      Pharynx: Oropharynx is clear. No oropharyngeal exudate.   Eyes:      Extraocular Movements: Extraocular movements intact.      Conjunctiva/sclera: Conjunctivae normal.   Neck:      Thyroid: No thyroid mass or thyromegaly.   Cardiovascular:      Rate and Rhythm: Normal rate and regular rhythm.      Pulses: Normal pulses.      Heart sounds: Normal heart sounds. No murmur heard.  Pulmonary:      Effort: Pulmonary effort is normal. No respiratory distress.      Breath sounds: Normal breath sounds. No wheezing.   Abdominal:      General: Abdomen is flat. Bowel sounds are normal. There is no distension.      Palpations: Abdomen is soft.      Tenderness: There is no abdominal tenderness.   Musculoskeletal:      Cervical back: Normal range of motion and neck supple.      Right lower leg: No edema.      Left lower leg: No edema.   Skin:     General: Skin is warm and dry.   Neurological:      General: No focal deficit present.      Mental Status: She is alert.   Psychiatric:         Mood and Affect: Mood normal.         Behavior: Behavior normal.

## 2024-07-26 DIAGNOSIS — R40.0 DAYTIME SOMNOLENCE: Primary | ICD-10-CM

## 2024-10-28 ENCOUNTER — OFFICE VISIT (OUTPATIENT)
Dept: FAMILY MEDICINE CLINIC | Facility: CLINIC | Age: 64
End: 2024-10-28
Payer: COMMERCIAL

## 2024-10-28 VITALS
HEART RATE: 80 BPM | SYSTOLIC BLOOD PRESSURE: 123 MMHG | OXYGEN SATURATION: 95 % | WEIGHT: 155.65 LBS | TEMPERATURE: 98.3 F | BODY MASS INDEX: 28.47 KG/M2 | DIASTOLIC BLOOD PRESSURE: 66 MMHG

## 2024-10-28 DIAGNOSIS — F43.10 PTSD (POST-TRAUMATIC STRESS DISORDER): ICD-10-CM

## 2024-10-28 DIAGNOSIS — Z87.898 HISTORY OF PREDIABETES: ICD-10-CM

## 2024-10-28 DIAGNOSIS — J30.1 SEASONAL ALLERGIC RHINITIS DUE TO POLLEN: ICD-10-CM

## 2024-10-28 DIAGNOSIS — J45.20 MILD INTERMITTENT ASTHMA WITHOUT STATUS ASTHMATICUS WITHOUT COMPLICATION: ICD-10-CM

## 2024-10-28 DIAGNOSIS — K21.9 GASTROESOPHAGEAL REFLUX DISEASE WITHOUT ESOPHAGITIS: ICD-10-CM

## 2024-10-28 DIAGNOSIS — K76.89 HEPATIC CYST: ICD-10-CM

## 2024-10-28 DIAGNOSIS — L71.9 ROSACEA: ICD-10-CM

## 2024-10-28 DIAGNOSIS — E78.2 MIXED HYPERLIPIDEMIA: ICD-10-CM

## 2024-10-28 DIAGNOSIS — E55.9 VITAMIN D INSUFFICIENCY: ICD-10-CM

## 2024-10-28 DIAGNOSIS — C34.32 MALIGNANT NEOPLASM OF LOWER LOBE OF LEFT LUNG (HCC): Primary | ICD-10-CM

## 2024-10-28 PROCEDURE — 99214 OFFICE O/P EST MOD 30 MIN: CPT | Performed by: FAMILY MEDICINE

## 2024-10-28 RX ORDER — AMOXICILLIN 250 MG
CAPSULE ORAL
COMMUNITY
Start: 2024-06-29

## 2024-10-28 NOTE — ASSESSMENT & PLAN NOTE
Component  Ref Range & Units 2/10/24  7:01 AM   Cholesterol  <200 mg/dL 272 High    Triglycerides  <150 mg/dL 121   Cholesterol, HDL, Direct  23 - 92 mg/dL 81   Cholesterol, Non-HDL  <160 mg/dL 191 High    LDL Cholesterol  <130 mg/dL 167 High    Comment: LDL Cholesterol was calculated using the Friedewald equation. Direct measurement of LDL is not indicated for this patient based on [facility]'s analytical algorithm for measurement of LDL Cholesterol.   Chol/HDL Ratio 3.4     ASCVD risk 4.6% - low risk  Continue to monitor     Orders:    CBC and differential; Future    Comprehensive metabolic panel; Future    Lipid panel; Future    Hemoglobin A1C; Future

## 2024-10-28 NOTE — PROGRESS NOTES
Ambulatory Visit  Name: Dede Hernandez      : 1960      MRN: [id number]  Encounter Provider: Julia Hong DO  Encounter Date: 10/28/2024   Encounter department: [facility]    Assessment & Plan  Malignant neoplasm of lower lobe of left lung (HCC)  Follows with oncology - mgmt per specialist - reports her oncologist asked for the below labs to be added to her blood work I order today.     Orders:    Magnesium; Future    Folate; Future    Phosphorus; Future    Mild intermittent asthma without status asthmaticus without complication  Chronic, continue prn albuterol         Seasonal allergic rhinitis due to pollen  Chronic, continue zyrtec, flonase         Hepatic cyst  Stable on repeat CT of lung imaging done for monitoring of her lung cancer history - continue to monitor LFTs         Gastroesophageal reflux disease without esophagitis  Chronic, continue PPI, recommend prn pepcid in evening as well         PTSD (post-traumatic stress disorder)  With anxiety  Patient has followed with psychiatry in the past, last saw them about 3 years ago.  She reports trying and failing the following medications: Zoloft, Ativan, Elavil, Cymbalta, Wellbutrin, Topamax, BuSpar, Klonopin.  She reports all of these medication increased agitation for her and she did not tolerate them more than a week or 2.  She reports the only medication that helps is Xanax, but we discussed my recommendation would be for her to reestablish with psychiatry given the complex history of her failed medications.  Continue with counseling every 2 weeks as scheduled.         Rosacea  Stable   Continue to monitor          Mixed hyperlipidemia  Component  Ref Range & Units 2/10/24  7:01 AM   Cholesterol  <200 mg/dL 272 High    Triglycerides  <150 mg/dL 121   Cholesterol, HDL, Direct  23 - 92 mg/dL 81   Cholesterol, Non-HDL  <160 mg/dL 191 High    LDL Cholesterol  <130 mg/dL 167 High    Comment: LDL Cholesterol was  calculated using the Friedewald equation. Direct measurement of LDL is not indicated for this patient based on [facility]'s analytical algorithm for measurement of LDL Cholesterol.   Chol/HDL Ratio 3.4     ASCVD risk 4.6% - low risk  Continue to monitor     Orders:    CBC and differential; Future    Comprehensive metabolic panel; Future    Lipid panel; Future    Hemoglobin A1C; Future    Vitamin D insufficiency    Orders:    Vitamin D 25 hydroxy; Future    History of prediabetes    Orders:    Comprehensive metabolic panel; Future    Hemoglobin A1C; Future          Return in about 38 weeks (around 7/21/2025) for Annual physical.    History of Present Illness     HPI  Chief Complaint   Patient presents with    Follow-up     Medication seeing how everything is going      Patient presents for follow up. No acute concerns today. She updated me on her health - she had a near fall at work where she caught her arm and suffered a labral tear. She is in the process of getting est with ortho to disucss treatment options. This is all being done through her employer as the injury occurred at work.       History obtained from : patient  Review of Systems   Constitutional:  Negative for appetite change, chills, diaphoresis, fatigue, fever and unexpected weight change.   Eyes:  Negative for visual disturbance.   Respiratory:  Negative for shortness of breath.    Cardiovascular:  Negative for chest pain and leg swelling.   Gastrointestinal:  Negative for constipation and diarrhea.   Endocrine: Negative for polydipsia and polyuria.   Genitourinary:  Negative for frequency.   Neurological:  Negative for dizziness, light-headedness and headaches.   Psychiatric/Behavioral:  Negative for dysphoric mood.      Current Outpatient Medications on File Prior to Visit   Medication Sig Dispense Refill    albuterol (PROVENTIL HFA,VENTOLIN HFA) 90 mcg/act inhaler Inhale 2 puffs every 4 (four) hours as needed for wheezing or shortness of breath 18 g 0     cetirizine (ZyrTEC) 10 MG chewable tablet Chew 10 mg daily      cholecalciferol (VITAMIN D3) 1,000 units tablet Take 1,000 Units by mouth daily      Cobalamin Combinations (B-12) 100-5000 MCG SUBL       fluticasone (FLONASE) 50 mcg/act nasal spray 1 spray into each nostril daily 16 g 3    MAGNESIUM COMPLEX HIGH POTENCY PO       pantoprazole (PROTONIX) 40 mg tablet Take 1 tablet (40 mg total) by mouth daily in the early morning 90 tablet 1    [DISCONTINUED] Clindamycin Phos-Benzoyl Perox gel Apply 1 Application topically daily at bedtime 50 g 0    [DISCONTINUED] Multiple Vitamins-Minerals (VITAMIN D3 COMPLETE PO) Take by mouth       No current facility-administered medications on file prior to visit.      Social History     Tobacco Use    Smoking status: Former     Current packs/day: 0.00     Types: Cigarettes     Quit date: 2008     Years since quittin.8    Smokeless tobacco: Never   Vaping Use    Vaping status: Never Used   Substance and Sexual Activity    Alcohol use: Not Currently    Drug use: Never    Sexual activity: Not on file         Objective     /66 (BP Location: Right arm, Patient Position: Sitting, Cuff Size: Standard)   Pulse 80   Temp 98.3 °F (36.8 °C) (Tympanic)   Wt 70.6 kg (155 lb 10.3 oz)   SpO2 95%   BMI 28.47 kg/m²     Physical Exam  Vitals reviewed.   Constitutional:       General: She is not in acute distress.     Appearance: She is well-developed and normal weight. She is not ill-appearing.   HENT:      Head: Normocephalic and atraumatic.      Right Ear: External ear normal.      Left Ear: External ear normal.      Nose: Nose normal.   Eyes:      Extraocular Movements: Extraocular movements intact.      Conjunctiva/sclera: Conjunctivae normal.   Neck:      Vascular: No carotid bruit or JVD.   Cardiovascular:      Rate and Rhythm: Normal rate and regular rhythm.      Heart sounds: Normal heart sounds. No murmur heard.  Pulmonary:      Effort: Pulmonary effort is normal.       Breath sounds: Normal breath sounds.   Abdominal:      General: Abdomen is flat.      Palpations: Abdomen is soft.   Musculoskeletal:      Cervical back: Neck supple.      Right lower leg: No edema.      Left lower leg: No edema.   Neurological:      General: No focal deficit present.      Mental Status: She is alert.   Psychiatric:         Mood and Affect: Mood normal.         Behavior: Behavior normal.

## 2024-10-28 NOTE — ASSESSMENT & PLAN NOTE
Follows with oncology - mgmt per specialist - reports her oncologist asked for the below labs to be added to her blood work I order today.     Orders:    Magnesium; Future    Folate; Future    Phosphorus; Future

## 2024-12-02 ENCOUNTER — TELEPHONE (OUTPATIENT)
Age: 64
End: 2024-12-02

## 2024-12-02 DIAGNOSIS — K76.89 HEPATIC CYST: ICD-10-CM

## 2024-12-02 DIAGNOSIS — K21.9 GASTROESOPHAGEAL REFLUX DISEASE WITHOUT ESOPHAGITIS: Primary | ICD-10-CM

## 2024-12-02 NOTE — TELEPHONE ENCOUNTER
Patient called in as advised to notify  that if lab orders Ferritin and Iron with Tidc, B12 and folic acid can be added on to her labs.  Please advise and notify patient

## 2024-12-19 ENCOUNTER — NURSE TRIAGE (OUTPATIENT)
Age: 64
End: 2024-12-19

## 2024-12-19 NOTE — TELEPHONE ENCOUNTER
"Patient reports that her stool has been black for a week. She has taken peptobismol a few times in the last week. States she feels like her bowel movements are different than they usually are and she has noticed that she has bloating and gas. OV scheduled for Monday, did not have availability for appointment ana luisa.  Advised that if she has any other symptoms that arise such as fever, bright red blood in stool, abdominal pain or increase in current symptoms, please call the office or go to ED.     Reason for Disposition   Patient wants to be seen    Answer Assessment - Initial Assessment Questions  1. STOOL PATTERN OR FREQUENCY: \"How often do you have a bowel movement (BM)?\"  (Normal range: 3 times a day to every 3 days)  \"When was your last BM?\"        Has a daily BM but has noticed that her stools are black x1 week. Has taken peptobismol a few times in the last week.   2. STRAINING: \"Do you have to strain to have a BM?\"       yes  3. ONSET: \"When did the constipation begin?\"      Has IBS-C  4. RECTAL PAIN: \"Does your rectum hurt when the stool comes out?\" If Yes, ask: \"Do you have hemorrhoids? How bad is the pain?\"  (Scale 1-10; or mild, moderate, severe)      Yes, occasionally but states she has anal fissures and hemorrhoids.   5. BM COMPOSITION: \"Are the stools hard?\"       Was soft today  6. BLOOD ON STOOLS: \"Has there been any blood on the toilet tissue or on the surface of the BM?\" If Yes, ask: \"When was the last time?\"      Stools have been black x 1 week. Has taken peptobismol several times in the last week.   7. CHRONIC CONSTIPATION: \"Is this a new problem for you?\"  If No, ask: \"How long have you had this problem?\" (days, weeks, months)       Yes, IBS-C    9. MEDICINES: \"Have you been taking any new medicines?\" \"Are you taking any narcotic pain medicines?\" (e.g., Dilaudid, morphine, Percocet, Vicodin)      no  10. LAXATIVES: \"Have you been using any stool softeners, laxatives, or enemas?\"  If Yes, ask \"What, " "how often, and when was the last time?\"        occasionally  12. CAUSE: \"What do you think is causing the constipation?\"         IBS-C  13. MEDICAL HISTORY: \"Do you have a history of hemorrhoids, rectal fissures, rectal surgery, or rectal abscess?\"          yes  14. OTHER SYMPTOMS: \"Do you have any other symptoms?\" (e.g., abdomen pain, blnooating, fever, vomiting)        Bloating and gas    Protocols used: Constipation-Adult-[place]    "

## 2024-12-31 ENCOUNTER — APPOINTMENT (OUTPATIENT)
Dept: LAB | Facility: HOSPITAL | Age: 64
End: 2024-12-31
Payer: COMMERCIAL

## 2024-12-31 DIAGNOSIS — K76.89 HEPATIC CYST: ICD-10-CM

## 2024-12-31 DIAGNOSIS — K21.9 GASTROESOPHAGEAL REFLUX DISEASE WITHOUT ESOPHAGITIS: ICD-10-CM

## 2024-12-31 DIAGNOSIS — C34.32 MALIGNANT NEOPLASM OF LOWER LOBE OF LEFT LUNG (HCC): ICD-10-CM

## 2024-12-31 DIAGNOSIS — E55.9 VITAMIN D INSUFFICIENCY: ICD-10-CM

## 2024-12-31 DIAGNOSIS — Z87.898 HISTORY OF PREDIABETES: ICD-10-CM

## 2024-12-31 DIAGNOSIS — E78.2 MIXED HYPERLIPIDEMIA: ICD-10-CM

## 2024-12-31 LAB
25(OH)D3 SERPL-MCNC: 16.8 NG/ML (ref 30–100)
ALBUMIN SERPL BCG-MCNC: 4 G/DL (ref 3.5–5)
ALP SERPL-CCNC: 71 U/L (ref 34–104)
ALT SERPL W P-5'-P-CCNC: 42 U/L (ref 7–52)
ANION GAP SERPL CALCULATED.3IONS-SCNC: 4 MMOL/L (ref 4–13)
AST SERPL W P-5'-P-CCNC: 26 U/L (ref 13–39)
BASOPHILS # BLD AUTO: 0.05 THOUSANDS/ΜL (ref 0–0.1)
BASOPHILS NFR BLD AUTO: 1 % (ref 0–1)
BILIRUB SERPL-MCNC: 1.02 MG/DL (ref 0.2–1)
BUN SERPL-MCNC: 17 MG/DL (ref 5–25)
CALCIUM SERPL-MCNC: 9.3 MG/DL (ref 8.4–10.2)
CHLORIDE SERPL-SCNC: 105 MMOL/L (ref 96–108)
CHOLEST SERPL-MCNC: 281 MG/DL (ref ?–200)
CO2 SERPL-SCNC: 30 MMOL/L (ref 21–32)
CREAT SERPL-MCNC: 0.72 MG/DL (ref 0.6–1.3)
EOSINOPHIL # BLD AUTO: 0.15 THOUSAND/ΜL (ref 0–0.61)
EOSINOPHIL NFR BLD AUTO: 3 % (ref 0–6)
ERYTHROCYTE [DISTWIDTH] IN BLOOD BY AUTOMATED COUNT: 12.1 % (ref 11.6–15.1)
EST. AVERAGE GLUCOSE BLD GHB EST-MCNC: 117 MG/DL
FERRITIN SERPL-MCNC: 41 NG/ML (ref 11–307)
FOLATE SERPL-MCNC: 7.7 NG/ML
GFR SERPL CREATININE-BSD FRML MDRD: 88 ML/MIN/1.73SQ M
GLUCOSE P FAST SERPL-MCNC: 89 MG/DL (ref 65–99)
HBA1C MFR BLD: 5.7 %
HCT VFR BLD AUTO: 41.8 % (ref 34.8–46.1)
HDLC SERPL-MCNC: 66 MG/DL
HGB BLD-MCNC: 13.7 G/DL (ref 11.5–15.4)
IMM GRANULOCYTES # BLD AUTO: 0.01 THOUSAND/UL (ref 0–0.2)
IMM GRANULOCYTES NFR BLD AUTO: 0 % (ref 0–2)
IRON SATN MFR SERPL: 41 % (ref 15–50)
IRON SERPL-MCNC: 148 UG/DL (ref 50–212)
LDLC SERPL CALC-MCNC: 175 MG/DL (ref 0–100)
LYMPHOCYTES # BLD AUTO: 1.07 THOUSANDS/ΜL (ref 0.6–4.47)
LYMPHOCYTES NFR BLD AUTO: 24 % (ref 14–44)
MAGNESIUM SERPL-MCNC: 2.1 MG/DL (ref 1.9–2.7)
MCH RBC QN AUTO: 30.4 PG (ref 26.8–34.3)
MCHC RBC AUTO-ENTMCNC: 32.8 G/DL (ref 31.4–37.4)
MCV RBC AUTO: 93 FL (ref 82–98)
MONOCYTES # BLD AUTO: 0.39 THOUSAND/ΜL (ref 0.17–1.22)
MONOCYTES NFR BLD AUTO: 9 % (ref 4–12)
NEUTROPHILS # BLD AUTO: 2.82 THOUSANDS/ΜL (ref 1.85–7.62)
NEUTS SEG NFR BLD AUTO: 63 % (ref 43–75)
NONHDLC SERPL-MCNC: 215 MG/DL
NRBC BLD AUTO-RTO: 0 /100 WBCS
PHOSPHATE SERPL-MCNC: 3.2 MG/DL (ref 2.3–4.1)
PLATELET # BLD AUTO: 245 THOUSANDS/UL (ref 149–390)
PMV BLD AUTO: 9.9 FL (ref 8.9–12.7)
POTASSIUM SERPL-SCNC: 4.8 MMOL/L (ref 3.5–5.3)
PROT SERPL-MCNC: 6.9 G/DL (ref 6.4–8.4)
RBC # BLD AUTO: 4.51 MILLION/UL (ref 3.81–5.12)
SODIUM SERPL-SCNC: 139 MMOL/L (ref 135–147)
TIBC SERPL-MCNC: 361.2 UG/DL (ref 250–450)
TRANSFERRIN SERPL-MCNC: 258 MG/DL (ref 203–362)
TRIGL SERPL-MCNC: 200 MG/DL (ref ?–150)
UIBC SERPL-MCNC: 213 UG/DL (ref 155–355)
VIT B12 SERPL-MCNC: 646 PG/ML (ref 180–914)
WBC # BLD AUTO: 4.49 THOUSAND/UL (ref 4.31–10.16)

## 2024-12-31 PROCEDURE — 83550 IRON BINDING TEST: CPT

## 2024-12-31 PROCEDURE — 36415 COLL VENOUS BLD VENIPUNCTURE: CPT

## 2024-12-31 PROCEDURE — 82728 ASSAY OF FERRITIN: CPT

## 2024-12-31 PROCEDURE — 80061 LIPID PANEL: CPT

## 2024-12-31 PROCEDURE — 82306 VITAMIN D 25 HYDROXY: CPT

## 2024-12-31 PROCEDURE — 80053 COMPREHEN METABOLIC PANEL: CPT

## 2024-12-31 PROCEDURE — 85025 COMPLETE CBC W/AUTO DIFF WBC: CPT

## 2024-12-31 PROCEDURE — 82746 ASSAY OF FOLIC ACID SERUM: CPT

## 2024-12-31 PROCEDURE — 83540 ASSAY OF IRON: CPT

## 2024-12-31 PROCEDURE — 83735 ASSAY OF MAGNESIUM: CPT

## 2024-12-31 PROCEDURE — 84100 ASSAY OF PHOSPHORUS: CPT

## 2024-12-31 PROCEDURE — 83036 HEMOGLOBIN GLYCOSYLATED A1C: CPT

## 2024-12-31 PROCEDURE — 82607 VITAMIN B-12: CPT

## 2025-01-31 ENCOUNTER — RESULTS FOLLOW-UP (OUTPATIENT)
Dept: FAMILY MEDICINE CLINIC | Facility: CLINIC | Age: 65
End: 2025-01-31

## 2025-01-31 DIAGNOSIS — E55.9 VITAMIN D DEFICIENCY: Primary | ICD-10-CM

## 2025-01-31 RX ORDER — ERGOCALCIFEROL 1.25 MG/1
50000 CAPSULE, LIQUID FILLED ORAL WEEKLY
Qty: 12 CAPSULE | Refills: 2 | Status: SHIPPED | OUTPATIENT
Start: 2025-01-31

## 2025-01-31 NOTE — TELEPHONE ENCOUNTER
----- Message from Julia Romero DO sent at 1/31/2025  8:47 AM EST -----  Please let patient know I reviewed her labs. I recommend prescription strength vitamin D supplement that I will send to her pharmacy. She should take this weekly and we will recheck at her Annual with me in July. Her cholesterol is also high. I recommend focus on healthy lifestyle habits and we will recheck in July as well. All other labs are stable. Please let me know if there are any questions or concerns.

## 2025-03-26 ENCOUNTER — TELEPHONE (OUTPATIENT)
Dept: FAMILY MEDICINE CLINIC | Facility: CLINIC | Age: 65
End: 2025-03-26

## 2025-03-26 ENCOUNTER — OFFICE VISIT (OUTPATIENT)
Dept: FAMILY MEDICINE CLINIC | Facility: CLINIC | Age: 65
End: 2025-03-26
Payer: COMMERCIAL

## 2025-03-26 VITALS
DIASTOLIC BLOOD PRESSURE: 62 MMHG | WEIGHT: 153 LBS | TEMPERATURE: 97.9 F | HEART RATE: 69 BPM | HEIGHT: 62 IN | SYSTOLIC BLOOD PRESSURE: 127 MMHG | OXYGEN SATURATION: 98 % | BODY MASS INDEX: 28.16 KG/M2

## 2025-03-26 DIAGNOSIS — F51.05 INSOMNIA DUE TO OTHER MENTAL DISORDER: ICD-10-CM

## 2025-03-26 DIAGNOSIS — C34.32 MALIGNANT NEOPLASM OF LOWER LOBE OF LEFT LUNG (HCC): ICD-10-CM

## 2025-03-26 DIAGNOSIS — F41.9 ANXIETY: ICD-10-CM

## 2025-03-26 DIAGNOSIS — C34.92 ADENOCARCINOMA OF LEFT LUNG (HCC): Primary | ICD-10-CM

## 2025-03-26 DIAGNOSIS — F99 INSOMNIA DUE TO OTHER MENTAL DISORDER: ICD-10-CM

## 2025-03-26 DIAGNOSIS — K76.9 HEPATIC LESION: ICD-10-CM

## 2025-03-26 DIAGNOSIS — R93.89 ABNORMAL MRI: ICD-10-CM

## 2025-03-26 PROCEDURE — 99214 OFFICE O/P EST MOD 30 MIN: CPT | Performed by: FAMILY MEDICINE

## 2025-03-26 RX ORDER — MIRTAZAPINE 7.5 MG/1
7.5 TABLET, FILM COATED ORAL
Qty: 30 TABLET | Refills: 0 | Status: SHIPPED | OUTPATIENT
Start: 2025-03-26 | End: 2025-03-27

## 2025-03-26 NOTE — PROGRESS NOTES
"Name: Dede Hernandez      : 1960      MRN: [id number]  Encounter Provider: Julia Romero DO  Encounter Date: 3/26/2025   Encounter department: [facility] PRIMARY CARE  :  Assessment & Plan  Adenocarcinoma of left lung (HCC)  History of stage 1A adenocarcinoma of the left lower lobe s/p left lower lobectomy in 2018. She did not receive adjuvant chemotherapy or radiation   She saw [facility] oncology to [facility] for follow up last year - Plan was for annual non contrast CT chest and follow ups - she has not seen them since.   She presents today with recent MRI findings with ortho with \"Lesion present in the anterior clavicular portion of the deltoid musculature with low T2 signal on the fat-suppressed fluid sensitive images, intermediate to   low signal on the T2 nonfat suppressed images, isointense on T1 measuring about 2.2 cm. This lesion may be related to somewhat mature myositis ossificans. Differential consideration include paraosteal/soft tissue neoplasm. No bone marrow edema or destructive osseous changes or associated soft tissue edema identified. Consider correlation with PET/CT.\" Also with \"Partially seen is a 5.5 cm lobulated T2 hyperintense structure with internal septations in the liver reflecting a mildly complex hepatic cyst\"  Patient unhappy with prior oncology visit - she would like to see a physician  I recommend [facility] in [place]  Given new MRI findings - recommend PET CT and stat referral to oncology - referral faxed today     Orders:    Ambulatory Referral to Hematology / Oncology; Future    NM PET CT tumor imaging whole body; Future    Malignant neoplasm of lower lobe of left lung (HCC)  See above  Orders:    Ambulatory Referral to Hematology / Oncology; Future    NM PET CT tumor imaging whole body; Future    Abnormal MRI  See above  Orders:    NM PET CT tumor imaging whole body; Future    Hepatic lesion  See above    Orders:    NM PET CT tumor imaging " whole body; Future    Anxiety  Chronic, not well controlled - significantly impacting sleep  Has failed Zoloft, ativan, elevil, cymbalta, wellbutrin, topamax, buspar, klonopin in the past  Reports the only thing that ever worked was xanax - would advise against this at this time  Recommend trying Remeron at bedtime  Follow up in 4 weeks to recheck     Orders:    mirtazapine (REMERON) 7.5 MG tablet; Take 1 tablet (7.5 mg total) by mouth daily at bedtime    Insomnia due to other mental disorder  See above   Orders:    mirtazapine (REMERON) 7.5 MG tablet; Take 1 tablet (7.5 mg total) by mouth daily at bedtime      Return in about 4 weeks (around 4/23/2025) for Recheck anxiety.       History of Present Illness   Chief Complaint   Patient presents with    Follow-up     Recent MRI shows a lesion in right breast and lesion on liver has increased in 6   months          HPI    Patient presents to discuss recent MRI findings from her orthopedic and was told to follow-up with her PCP.  She also reports overall increased anxiety and poor sleep as a result of her ongoing medical and orthopedic issues over the past 6 months.  She is only sleeping 4 to 5 hours a night on average and constantly having trouble getting back to sleep after waking up in the middle of the night.  She admits this is likely related to her increased anxiety due to her medical conditions.  She is open to treatment for anxiety again at this time.    Regarding her oncology needs, she is adamant she would like to see an oncology physician and is interested in referral to [facility] in [place].  She was unhappy with her prior oncology visit last year with [facility].      Review of Systems   Constitutional:  Negative for appetite change, chills, diaphoresis, fever and unexpected weight change.   Eyes:  Negative for visual disturbance.   Respiratory:  Negative for shortness of breath.    Cardiovascular:  Negative for chest pain and leg swelling.   Gastrointestinal:   "Negative for constipation and diarrhea.   Endocrine: Negative for polydipsia and polyuria.   Genitourinary:  Negative for frequency.   Musculoskeletal:  Positive for arthralgias.   Neurological:  Negative for dizziness, light-headedness and headaches.   Psychiatric/Behavioral:  Positive for sleep disturbance. The patient is nervous/anxious.        Objective   /62 (BP Location: Right arm, Patient Position: Sitting, Cuff Size: Standard)   Pulse 69   Temp 97.9 °F (36.6 °C) (Tympanic)   Ht 5' 2\" (1.575 m)   Wt 69.4 kg (153 lb)   SpO2 98%   BMI 27.98 kg/m²      Physical Exam  Vitals reviewed.   Constitutional:       General: She is not in acute distress.     Appearance: She is normal weight. She is not ill-appearing.   HENT:      Head: Normocephalic and atraumatic.      Right Ear: Tympanic membrane, ear canal and external ear normal.      Left Ear: Tympanic membrane, ear canal and external ear normal.   Cardiovascular:      Rate and Rhythm: Normal rate.   Pulmonary:      Effort: Pulmonary effort is normal.   Musculoskeletal:        Arms:       Comments: Tenderness to palpation in area marked above   Neurological:      Mental Status: She is alert.         "

## 2025-03-26 NOTE — ASSESSMENT & PLAN NOTE
"History of stage 1A adenocarcinoma of the left lower lobe s/p left lower lobectomy in 2018. She did not receive adjuvant chemotherapy or radiation   She saw [facility] oncology to [facility] for follow up last year - Plan was for annual non contrast CT chest and follow ups - she has not seen them since.   She presents today with recent MRI findings with ortho with \"Lesion present in the anterior clavicular portion of the deltoid musculature with low T2 signal on the fat-suppressed fluid sensitive images, intermediate to   low signal on the T2 nonfat suppressed images, isointense on T1 measuring about 2.2 cm. This lesion may be related to somewhat mature myositis ossificans. Differential consideration include paraosteal/soft tissue neoplasm. No bone marrow edema or destructive osseous changes or associated soft tissue edema identified. Consider correlation with PET/CT.\" Also with \"Partially seen is a 5.5 cm lobulated T2 hyperintense structure with internal septations in the liver reflecting a mildly complex hepatic cyst\"  Patient unhappy with prior oncology visit - she would like to see a physician  I recommend [facility] oncology in [place]  Given new MRI findings - recommend PET CT and stat referral to oncology - referral faxed today     Orders:    Ambulatory Referral to Hematology / Oncology; Future    NM PET CT tumor imaging whole body; Future    "

## 2025-03-26 NOTE — TELEPHONE ENCOUNTER
Patient is scheduled for her PET scan 04/03/2025 @ 2PM through [facility]. Attempted to schedule [facility] Hem/Onc referral but was advised by Nalini that the referral will need to be faxed to [phone] to be triaged and they will contact the patient directly. Referral printed and faxed. Referral for PET scan also printed and faxed to [phone].

## 2025-03-26 NOTE — ASSESSMENT & PLAN NOTE
See above  Orders:    Ambulatory Referral to Hematology / Oncology; Future    NM PET CT tumor imaging whole body; Future

## 2025-03-27 ENCOUNTER — TELEPHONE (OUTPATIENT)
Age: 65
End: 2025-03-27

## 2025-03-27 PROBLEM — H40.2234 BILATERAL CHRONIC PRIMARY ANGLE-CLOSURE GLAUCOMA, INDETERMINATE STAGE: Status: ACTIVE | Noted: 2025-03-27

## 2025-03-27 NOTE — TELEPHONE ENCOUNTER
"Attempted to reach Dede to relay the message per Dr. Romero \"We did not have this diagnosis on her medical history or problem list. We need to add this (I will add it now) and she should not take the new medication.\" Patient didn't answer/ left a voicemail.     "

## 2025-03-27 NOTE — TELEPHONE ENCOUNTER
Patient called stating that she was prescribed mirtazapine (REMERON) 7.5 MG tablet and one of the contraindications is narrow angle closure which she has.  Patient would like a call back on how to precede with this medication. [phone]

## 2025-04-08 ENCOUNTER — TELEPHONE (OUTPATIENT)
Age: 65
End: 2025-04-08

## 2025-04-24 ENCOUNTER — OFFICE VISIT (OUTPATIENT)
Dept: FAMILY MEDICINE CLINIC | Facility: CLINIC | Age: 65
End: 2025-04-24
Payer: COMMERCIAL

## 2025-04-24 VITALS
OXYGEN SATURATION: 99 % | HEART RATE: 92 BPM | HEIGHT: 62 IN | BODY MASS INDEX: 28.28 KG/M2 | SYSTOLIC BLOOD PRESSURE: 133 MMHG | DIASTOLIC BLOOD PRESSURE: 68 MMHG | WEIGHT: 153.66 LBS

## 2025-04-24 DIAGNOSIS — E55.9 VITAMIN D DEFICIENCY: ICD-10-CM

## 2025-04-24 DIAGNOSIS — M79.672 LEFT FOOT PAIN: ICD-10-CM

## 2025-04-24 DIAGNOSIS — E78.2 MIXED HYPERLIPIDEMIA: ICD-10-CM

## 2025-04-24 DIAGNOSIS — R10.32 GROIN PAIN, LEFT: ICD-10-CM

## 2025-04-24 DIAGNOSIS — F41.9 ANXIETY: Primary | ICD-10-CM

## 2025-04-24 DIAGNOSIS — R73.03 PREDIABETES: ICD-10-CM

## 2025-04-24 DIAGNOSIS — F43.10 PTSD (POST-TRAUMATIC STRESS DISORDER): ICD-10-CM

## 2025-04-24 PROCEDURE — 99214 OFFICE O/P EST MOD 30 MIN: CPT | Performed by: FAMILY MEDICINE

## 2025-04-24 NOTE — ASSESSMENT & PLAN NOTE
Chronic, not well controlled - significantly impacting sleep  Has failed Zoloft, ativan, elevil, cymbalta, wellbutrin, topamax, buspar, klonopin in the past  Reports the only thing that ever worked was xanax - would advise against this at this time  Remeron contraindicated due to glaucoma     Continue to monitor off medications

## 2025-04-24 NOTE — PROGRESS NOTES
"Name: Dede Hernandez      : 1960      MRN: [id number]  Encounter Provider: Julia Romero DO  Encounter Date: 2025   Encounter department: [facility] PRIMARY CARE  :  Assessment & Plan  Anxiety  Chronic, not well controlled - significantly impacting sleep  Has failed Zoloft, ativan, elevil, cymbalta, wellbutrin, topamax, buspar, klonopin in the past  Reports the only thing that ever worked was xanax - would advise against this at this time  Remeron contraindicated due to glaucoma     Continue to monitor off medications          PTSD (post-traumatic stress disorder)  With anxiety  Patient has followed with psychiatry in the past, last saw them about 3 years ago.  She reports trying and failing the following medications: Zoloft, Ativan, Elavil, Cymbalta, Wellbutrin, Topamax, BuSpar, Klonopin.  She reports all of these medication increased agitation for her and she did not tolerate them more than a week or 2.  She reports the only medication that helps is Xanax, but we discussed my recommendation would be for her to reestablish with psychiatry given the complex history of her failed medications.  Continue with counseling every 2 weeks as scheduled.           Left foot pain  Recent pet imaging: \"Multifocal regions of muscular uptake are noted, greatest in the distal abductor digiti minimi muscle in the left foot without definite correlating lesions on the CT images, likely inflammatory.  A malignant etiology is less likely. Consider MRI of the left foot with and without contrast for further evaluation. \"    Recommend PT, prn NSAIDs, podiatry eval     Orders:    Ambulatory Referral to Podiatry; Future    Ambulatory Referral to Physical Therapy; Future    Groin pain, left  Consider PT, will also obtain lumbosacral spine xray and then consider gabapentin pending this.     Orders:    Ambulatory Referral to Physical Therapy; Future    XR spine lumbar minimum 4 views non injury; " "Future        Return for Next scheduled follow up.       History of Present Illness   Chief Complaint   Patient presents with    Follow-up       HPI    Patient presents for left foot pain and review of recent PET scan results. She has pain at the left anterior lateral ankle on the ventral aspect of the left foot/ankle. The pain comes and goes. It is worse with movement and at times can last days.     She is also having recurrent left groin pain. Sharp/shooting pain that comes and goes but can be very alarming when it happens. Has not figured out a cause. Saw gyn and was told everything was normal on their exam.     Review of Systems   Constitutional:  Negative for appetite change, chills, diaphoresis, fever and unexpected weight change.   Eyes:  Negative for visual disturbance.   Respiratory:  Negative for shortness of breath.    Cardiovascular:  Negative for chest pain and leg swelling.   Gastrointestinal:  Negative for constipation and diarrhea.   Endocrine: Negative for polydipsia and polyuria.   Genitourinary:  Negative for frequency.   Musculoskeletal:  Positive for arthralgias.   Neurological:  Negative for dizziness, light-headedness and headaches.   Psychiatric/Behavioral:  Positive for sleep disturbance. The patient is not nervous/anxious.        Objective   /68 (BP Location: Left arm, Patient Position: Sitting, Cuff Size: Standard)   Pulse 92   Ht 5' 2\" (1.575 m)   Wt 69.7 kg (153 lb 10.6 oz)   SpO2 99%   BMI 28.10 kg/m²      Physical Exam  Vitals reviewed.   Constitutional:       General: She is not in acute distress.     Appearance: She is normal weight. She is not ill-appearing.   Cardiovascular:      Rate and Rhythm: Normal rate.   Pulmonary:      Effort: Pulmonary effort is normal.   Musculoskeletal:         General: Tenderness present.      Right lower leg: No edema.      Left lower leg: No edema.      Right foot: Normal.      Left foot: Tenderness present.        Legs:       Comments: " Area marked of tenderness of left foot   Neurological:      Mental Status: She is alert.   Psychiatric:         Mood and Affect: Mood normal.         Behavior: Behavior normal.

## 2025-04-24 NOTE — ASSESSMENT & PLAN NOTE
Consider PT, will also obtain lumbosacral spine xray and then consider gabapentin pending this.     Orders:    Ambulatory Referral to Physical Therapy; Future    XR spine lumbar minimum 4 views non injury; Future

## 2025-04-24 NOTE — ASSESSMENT & PLAN NOTE
"Recent pet imaging: \"Multifocal regions of muscular uptake are noted, greatest in the distal abductor digiti minimi muscle in the left foot without definite correlating lesions on the CT images, likely inflammatory.  A malignant etiology is less likely. Consider MRI of the left foot with and without contrast for further evaluation. \"    Recommend PT, prn NSAIDs, podiatry eval     Orders:    Ambulatory Referral to Podiatry; Future    Ambulatory Referral to Physical Therapy; Future    "

## 2025-04-25 RX ORDER — ERGOCALCIFEROL 1.25 MG/1
50000 CAPSULE, LIQUID FILLED ORAL WEEKLY
Qty: 12 CAPSULE | Refills: 0 | Status: SHIPPED | OUTPATIENT
Start: 2025-04-25

## 2025-07-17 ENCOUNTER — TELEPHONE (OUTPATIENT)
Age: 65
End: 2025-07-17

## 2025-07-17 NOTE — TELEPHONE ENCOUNTER
Patient called asking for her labs to be faxed to [facility], faxed to [phone].

## 2025-07-28 ENCOUNTER — OFFICE VISIT (OUTPATIENT)
Dept: FAMILY MEDICINE CLINIC | Facility: CLINIC | Age: 65
End: 2025-07-28
Payer: COMMERCIAL

## 2025-07-28 VITALS
DIASTOLIC BLOOD PRESSURE: 76 MMHG | HEART RATE: 88 BPM | BODY MASS INDEX: 29.01 KG/M2 | HEIGHT: 62 IN | WEIGHT: 157.63 LBS | OXYGEN SATURATION: 97 % | SYSTOLIC BLOOD PRESSURE: 122 MMHG

## 2025-07-28 DIAGNOSIS — Z12.31 ENCOUNTER FOR SCREENING MAMMOGRAM FOR BREAST CANCER: ICD-10-CM

## 2025-07-28 DIAGNOSIS — Z00.00 ANNUAL PHYSICAL EXAM: Primary | ICD-10-CM

## 2025-07-28 DIAGNOSIS — R73.03 PREDIABETES: ICD-10-CM

## 2025-07-28 DIAGNOSIS — J30.1 SEASONAL ALLERGIC RHINITIS DUE TO POLLEN: ICD-10-CM

## 2025-07-28 DIAGNOSIS — K76.89 HEPATIC CYST: ICD-10-CM

## 2025-07-28 DIAGNOSIS — F41.9 ANXIETY: ICD-10-CM

## 2025-07-28 DIAGNOSIS — K21.9 GASTROESOPHAGEAL REFLUX DISEASE WITHOUT ESOPHAGITIS: ICD-10-CM

## 2025-07-28 DIAGNOSIS — E78.2 MIXED HYPERLIPIDEMIA: ICD-10-CM

## 2025-07-28 DIAGNOSIS — F43.10 PTSD (POST-TRAUMATIC STRESS DISORDER): ICD-10-CM

## 2025-07-28 DIAGNOSIS — J45.20 MILD INTERMITTENT ASTHMA WITHOUT STATUS ASTHMATICUS WITHOUT COMPLICATION: ICD-10-CM

## 2025-07-28 DIAGNOSIS — E55.9 VITAMIN D DEFICIENCY: ICD-10-CM

## 2025-07-28 PROCEDURE — 99396 PREV VISIT EST AGE 40-64: CPT | Performed by: FAMILY MEDICINE

## 2025-07-28 PROCEDURE — 99214 OFFICE O/P EST MOD 30 MIN: CPT | Performed by: FAMILY MEDICINE

## 2025-08-20 ENCOUNTER — TELEPHONE (OUTPATIENT)
Age: 65
End: 2025-08-20